# Patient Record
Sex: FEMALE | Race: BLACK OR AFRICAN AMERICAN | NOT HISPANIC OR LATINO | URBAN - METROPOLITAN AREA
[De-identification: names, ages, dates, MRNs, and addresses within clinical notes are randomized per-mention and may not be internally consistent; named-entity substitution may affect disease eponyms.]

---

## 2024-06-12 ENCOUNTER — APPOINTMENT (OUTPATIENT)
Dept: RADIOLOGY | Facility: HOSPITAL | Age: 54
DRG: 919 | End: 2024-06-12
Payer: COMMERCIAL

## 2024-06-12 ENCOUNTER — HOSPITAL ENCOUNTER (INPATIENT)
Facility: HOSPITAL | Age: 54
LOS: 2 days | Discharge: HOME | DRG: 919 | End: 2024-06-14
Attending: STUDENT IN AN ORGANIZED HEALTH CARE EDUCATION/TRAINING PROGRAM | Admitting: OBSTETRICS & GYNECOLOGY
Payer: COMMERCIAL

## 2024-06-12 DIAGNOSIS — N18.6 ESRD (END STAGE RENAL DISEASE) (MULTI): ICD-10-CM

## 2024-06-12 DIAGNOSIS — N85.8 UTERINE MASS: ICD-10-CM

## 2024-06-12 DIAGNOSIS — L89.899 PRESSURE INJURY OF SKIN OF FOOT, UNSPECIFIED INJURY STAGE, UNSPECIFIED LATERALITY: ICD-10-CM

## 2024-06-12 DIAGNOSIS — R10.84 GENERALIZED ABDOMINAL PAIN: ICD-10-CM

## 2024-06-12 DIAGNOSIS — R01.1 MURMUR, CARDIAC: ICD-10-CM

## 2024-06-12 DIAGNOSIS — I10 HYPERTENSION, UNSPECIFIED TYPE: ICD-10-CM

## 2024-06-12 DIAGNOSIS — R10.2 PELVIC PAIN: ICD-10-CM

## 2024-06-12 DIAGNOSIS — N93.9 VAGINAL BLEEDING: Primary | ICD-10-CM

## 2024-06-12 LAB
ABO GROUP (TYPE) IN BLOOD: NORMAL
ALBUMIN SERPL BCP-MCNC: 3.2 G/DL (ref 3.4–5)
ALP SERPL-CCNC: 284 U/L (ref 33–110)
ALT SERPL W P-5'-P-CCNC: 7 U/L (ref 7–45)
ANION GAP SERPL CALC-SCNC: 25 MMOL/L (ref 10–20)
ANTIBODY SCREEN: NORMAL
AST SERPL W P-5'-P-CCNC: 13 U/L (ref 9–39)
BASOPHILS # BLD AUTO: 0.09 X10*3/UL (ref 0–0.1)
BASOPHILS # BLD AUTO: 0.1 X10*3/UL (ref 0–0.1)
BASOPHILS NFR BLD AUTO: 0.6 %
BASOPHILS NFR BLD AUTO: 0.7 %
BILIRUB SERPL-MCNC: 0.5 MG/DL (ref 0–1.2)
BUN SERPL-MCNC: 69 MG/DL (ref 6–23)
CALCIUM SERPL-MCNC: 7.8 MG/DL (ref 8.6–10.6)
CHLORIDE SERPL-SCNC: 96 MMOL/L (ref 98–107)
CO2 SERPL-SCNC: 24 MMOL/L (ref 21–32)
CREAT SERPL-MCNC: 9.69 MG/DL (ref 0.5–1.05)
EGFRCR SERPLBLD CKD-EPI 2021: 4 ML/MIN/1.73M*2
EOSINOPHIL # BLD AUTO: 0.27 X10*3/UL (ref 0–0.7)
EOSINOPHIL # BLD AUTO: 0.29 X10*3/UL (ref 0–0.7)
EOSINOPHIL NFR BLD AUTO: 1.8 %
EOSINOPHIL NFR BLD AUTO: 1.9 %
ERYTHROCYTE [DISTWIDTH] IN BLOOD BY AUTOMATED COUNT: 18.1 % (ref 11.5–14.5)
ERYTHROCYTE [DISTWIDTH] IN BLOOD BY AUTOMATED COUNT: 18.4 % (ref 11.5–14.5)
GLUCOSE BLD MANUAL STRIP-MCNC: 126 MG/DL (ref 74–99)
GLUCOSE BLD MANUAL STRIP-MCNC: 72 MG/DL (ref 74–99)
GLUCOSE BLD MANUAL STRIP-MCNC: 94 MG/DL (ref 74–99)
GLUCOSE SERPL-MCNC: 78 MG/DL (ref 74–99)
HBV SURFACE AB SER-ACNC: 668.5 MIU/ML
HBV SURFACE AG SERPL QL IA: NONREACTIVE
HCT VFR BLD AUTO: 24.4 % (ref 36–46)
HCT VFR BLD AUTO: 26.3 % (ref 36–46)
HGB BLD-MCNC: 7.8 G/DL (ref 12–16)
HGB BLD-MCNC: 8.6 G/DL (ref 12–16)
HOLD SPECIMEN: NORMAL
IMM GRANULOCYTES # BLD AUTO: 0.11 X10*3/UL (ref 0–0.7)
IMM GRANULOCYTES # BLD AUTO: 0.13 X10*3/UL (ref 0–0.7)
IMM GRANULOCYTES NFR BLD AUTO: 0.7 % (ref 0–0.9)
IMM GRANULOCYTES NFR BLD AUTO: 0.9 % (ref 0–0.9)
INR PPP: 1.3 (ref 0.9–1.1)
LYMPHOCYTES # BLD AUTO: 2.14 X10*3/UL (ref 1.2–4.8)
LYMPHOCYTES # BLD AUTO: 2.72 X10*3/UL (ref 1.2–4.8)
LYMPHOCYTES NFR BLD AUTO: 14.3 %
LYMPHOCYTES NFR BLD AUTO: 17.9 %
MCH RBC QN AUTO: 24.5 PG (ref 26–34)
MCH RBC QN AUTO: 24.6 PG (ref 26–34)
MCHC RBC AUTO-ENTMCNC: 32 G/DL (ref 32–36)
MCHC RBC AUTO-ENTMCNC: 32.7 G/DL (ref 32–36)
MCV RBC AUTO: 75 FL (ref 80–100)
MCV RBC AUTO: 77 FL (ref 80–100)
MONOCYTES # BLD AUTO: 1.73 X10*3/UL (ref 0.1–1)
MONOCYTES # BLD AUTO: 1.74 X10*3/UL (ref 0.1–1)
MONOCYTES NFR BLD AUTO: 11.5 %
MONOCYTES NFR BLD AUTO: 11.6 %
NEUTROPHILS # BLD AUTO: 10.23 X10*3/UL (ref 1.2–7.7)
NEUTROPHILS # BLD AUTO: 10.6 X10*3/UL (ref 1.2–7.7)
NEUTROPHILS NFR BLD AUTO: 67.4 %
NEUTROPHILS NFR BLD AUTO: 70.7 %
NRBC BLD-RTO: 0.1 /100 WBCS (ref 0–0)
NRBC BLD-RTO: 0.2 /100 WBCS (ref 0–0)
PLATELET # BLD AUTO: 283 X10*3/UL (ref 150–450)
PLATELET # BLD AUTO: 294 X10*3/UL (ref 150–450)
POTASSIUM SERPL-SCNC: 4.7 MMOL/L (ref 3.5–5.3)
PROT SERPL-MCNC: 7.1 G/DL (ref 6.4–8.2)
PROTHROMBIN TIME: 14.5 SECONDS (ref 9.8–12.8)
RBC # BLD AUTO: 3.18 X10*6/UL (ref 4–5.2)
RBC # BLD AUTO: 3.5 X10*6/UL (ref 4–5.2)
RH FACTOR (ANTIGEN D): NORMAL
SODIUM SERPL-SCNC: 140 MMOL/L (ref 136–145)
WBC # BLD AUTO: 15 X10*3/UL (ref 4.4–11.3)
WBC # BLD AUTO: 15.2 X10*3/UL (ref 4.4–11.3)

## 2024-06-12 PROCEDURE — 86706 HEP B SURFACE ANTIBODY: CPT | Performed by: STUDENT IN AN ORGANIZED HEALTH CARE EDUCATION/TRAINING PROGRAM

## 2024-06-12 PROCEDURE — 2500000004 HC RX 250 GENERAL PHARMACY W/ HCPCS (ALT 636 FOR OP/ED)

## 2024-06-12 PROCEDURE — 76857 US EXAM PELVIC LIMITED: CPT | Performed by: STUDENT IN AN ORGANIZED HEALTH CARE EDUCATION/TRAINING PROGRAM

## 2024-06-12 PROCEDURE — 86923 COMPATIBILITY TEST ELECTRIC: CPT

## 2024-06-12 PROCEDURE — 85610 PROTHROMBIN TIME: CPT | Performed by: NURSE PRACTITIONER

## 2024-06-12 PROCEDURE — 99223 1ST HOSP IP/OBS HIGH 75: CPT

## 2024-06-12 PROCEDURE — 87340 HEPATITIS B SURFACE AG IA: CPT | Performed by: STUDENT IN AN ORGANIZED HEALTH CARE EDUCATION/TRAINING PROGRAM

## 2024-06-12 PROCEDURE — 96375 TX/PRO/DX INJ NEW DRUG ADDON: CPT

## 2024-06-12 PROCEDURE — 74177 CT ABD & PELVIS W/CONTRAST: CPT

## 2024-06-12 PROCEDURE — 2500000002 HC RX 250 W HCPCS SELF ADMINISTERED DRUGS (ALT 637 FOR MEDICARE OP, ALT 636 FOR OP/ED): Performed by: STUDENT IN AN ORGANIZED HEALTH CARE EDUCATION/TRAINING PROGRAM

## 2024-06-12 PROCEDURE — 2550000001 HC RX 255 CONTRASTS: Performed by: OBSTETRICS & GYNECOLOGY

## 2024-06-12 PROCEDURE — 85025 COMPLETE CBC W/AUTO DIFF WBC: CPT | Performed by: NURSE PRACTITIONER

## 2024-06-12 PROCEDURE — 99291 CRITICAL CARE FIRST HOUR: CPT | Performed by: STUDENT IN AN ORGANIZED HEALTH CARE EDUCATION/TRAINING PROGRAM

## 2024-06-12 PROCEDURE — 74177 CT ABD & PELVIS W/CONTRAST: CPT | Performed by: RADIOLOGY

## 2024-06-12 PROCEDURE — 36415 COLL VENOUS BLD VENIPUNCTURE: CPT | Performed by: NURSE PRACTITIONER

## 2024-06-12 PROCEDURE — 82435 ASSAY OF BLOOD CHLORIDE: CPT | Performed by: NURSE PRACTITIONER

## 2024-06-12 PROCEDURE — 2500000001 HC RX 250 WO HCPCS SELF ADMINISTERED DRUGS (ALT 637 FOR MEDICARE OP): Performed by: STUDENT IN AN ORGANIZED HEALTH CARE EDUCATION/TRAINING PROGRAM

## 2024-06-12 PROCEDURE — 1210000001 HC SEMI-PRIVATE ROOM DAILY

## 2024-06-12 PROCEDURE — 96374 THER/PROPH/DIAG INJ IV PUSH: CPT

## 2024-06-12 PROCEDURE — 82947 ASSAY GLUCOSE BLOOD QUANT: CPT

## 2024-06-12 PROCEDURE — 76856 US EXAM PELVIC COMPLETE: CPT

## 2024-06-12 PROCEDURE — 86901 BLOOD TYPING SEROLOGIC RH(D): CPT | Performed by: NURSE PRACTITIONER

## 2024-06-12 PROCEDURE — 2500000004 HC RX 250 GENERAL PHARMACY W/ HCPCS (ALT 636 FOR OP/ED): Performed by: NURSE PRACTITIONER

## 2024-06-12 RX ORDER — METOCLOPRAMIDE HYDROCHLORIDE 5 MG/ML
10 INJECTION INTRAMUSCULAR; INTRAVENOUS EVERY 6 HOURS PRN
Status: DISCONTINUED | OUTPATIENT
Start: 2024-06-12 | End: 2024-06-14 | Stop reason: HOSPADM

## 2024-06-12 RX ORDER — ONDANSETRON HYDROCHLORIDE 2 MG/ML
4 INJECTION, SOLUTION INTRAVENOUS EVERY 6 HOURS PRN
Status: DISCONTINUED | OUTPATIENT
Start: 2024-06-12 | End: 2024-06-14 | Stop reason: HOSPADM

## 2024-06-12 RX ORDER — SEVELAMER CARBONATE 800 MG/1
800 TABLET, FILM COATED ORAL
Status: DISCONTINUED | OUTPATIENT
Start: 2024-06-12 | End: 2024-06-14 | Stop reason: HOSPADM

## 2024-06-12 RX ORDER — NAPROXEN SODIUM 220 MG/1
81 TABLET, FILM COATED ORAL DAILY
Status: DISCONTINUED | OUTPATIENT
Start: 2024-06-12 | End: 2024-06-14 | Stop reason: HOSPADM

## 2024-06-12 RX ORDER — HYDROMORPHONE HYDROCHLORIDE 1 MG/ML
INJECTION, SOLUTION INTRAMUSCULAR; INTRAVENOUS; SUBCUTANEOUS
Status: COMPLETED
Start: 2024-06-12 | End: 2024-06-12

## 2024-06-12 RX ORDER — ONDANSETRON HYDROCHLORIDE 2 MG/ML
4 INJECTION, SOLUTION INTRAVENOUS ONCE
Status: COMPLETED | OUTPATIENT
Start: 2024-06-12 | End: 2024-06-12

## 2024-06-12 RX ORDER — ONDANSETRON 4 MG/1
4 TABLET, FILM COATED ORAL EVERY 6 HOURS PRN
Status: DISCONTINUED | OUTPATIENT
Start: 2024-06-12 | End: 2024-06-14 | Stop reason: HOSPADM

## 2024-06-12 RX ORDER — DEXTROSE 50 % IN WATER (D50W) INTRAVENOUS SYRINGE
25
Status: DISCONTINUED | OUTPATIENT
Start: 2024-06-12 | End: 2024-06-14 | Stop reason: HOSPADM

## 2024-06-12 RX ORDER — DEXTROSE 50 % IN WATER (D50W) INTRAVENOUS SYRINGE
12.5
Status: DISCONTINUED | OUTPATIENT
Start: 2024-06-12 | End: 2024-06-14 | Stop reason: HOSPADM

## 2024-06-12 RX ORDER — INSULIN LISPRO 100 [IU]/ML
0-5 INJECTION, SOLUTION INTRAVENOUS; SUBCUTANEOUS
Status: DISCONTINUED | OUTPATIENT
Start: 2024-06-12 | End: 2024-06-12

## 2024-06-12 RX ORDER — AMLODIPINE BESYLATE 10 MG/1
10 TABLET ORAL DAILY
Status: DISCONTINUED | OUTPATIENT
Start: 2024-06-12 | End: 2024-06-14 | Stop reason: HOSPADM

## 2024-06-12 RX ORDER — CARVEDILOL 25 MG/1
25 TABLET ORAL 2 TIMES DAILY
Status: DISCONTINUED | OUTPATIENT
Start: 2024-06-12 | End: 2024-06-14 | Stop reason: HOSPADM

## 2024-06-12 RX ORDER — ONDANSETRON HYDROCHLORIDE 2 MG/ML
INJECTION, SOLUTION INTRAVENOUS
Status: COMPLETED
Start: 2024-06-12 | End: 2024-06-12

## 2024-06-12 RX ORDER — MORPHINE SULFATE 4 MG/ML
4 INJECTION INTRAVENOUS ONCE
Status: COMPLETED | OUTPATIENT
Start: 2024-06-12 | End: 2024-06-12

## 2024-06-12 RX ORDER — OXYCODONE HYDROCHLORIDE 5 MG/1
5 TABLET ORAL EVERY 6 HOURS PRN
Status: DISCONTINUED | OUTPATIENT
Start: 2024-06-12 | End: 2024-06-14 | Stop reason: HOSPADM

## 2024-06-12 RX ORDER — MEDROXYPROGESTERONE ACETATE 10 MG/1
10 TABLET ORAL EVERY 8 HOURS
Status: DISCONTINUED | OUTPATIENT
Start: 2024-06-12 | End: 2024-06-12

## 2024-06-12 RX ORDER — METOCLOPRAMIDE 10 MG/1
10 TABLET ORAL EVERY 6 HOURS PRN
Status: DISCONTINUED | OUTPATIENT
Start: 2024-06-12 | End: 2024-06-14 | Stop reason: HOSPADM

## 2024-06-12 RX ORDER — ACETAMINOPHEN 325 MG/1
650 TABLET ORAL EVERY 6 HOURS PRN
Status: DISCONTINUED | OUTPATIENT
Start: 2024-06-12 | End: 2024-06-14 | Stop reason: HOSPADM

## 2024-06-12 RX ORDER — HYDROMORPHONE HYDROCHLORIDE 1 MG/ML
0.5 INJECTION, SOLUTION INTRAMUSCULAR; INTRAVENOUS; SUBCUTANEOUS ONCE
Status: COMPLETED | OUTPATIENT
Start: 2024-06-12 | End: 2024-06-12

## 2024-06-12 RX ORDER — ATORVASTATIN CALCIUM 80 MG/1
80 TABLET, FILM COATED ORAL NIGHTLY
Status: DISCONTINUED | OUTPATIENT
Start: 2024-06-12 | End: 2024-06-14 | Stop reason: HOSPADM

## 2024-06-12 RX ORDER — MEDROXYPROGESTERONE ACETATE 10 MG/1
20 TABLET ORAL 3 TIMES DAILY
Status: DISCONTINUED | OUTPATIENT
Start: 2024-06-12 | End: 2024-06-13

## 2024-06-12 SDOH — SOCIAL STABILITY: SOCIAL INSECURITY: DO YOU FEEL UNSAFE GOING BACK TO THE PLACE WHERE YOU ARE LIVING?: YES

## 2024-06-12 SDOH — SOCIAL STABILITY: SOCIAL INSECURITY: ARE YOU OR HAVE YOU BEEN THREATENED OR ABUSED PHYSICALLY, EMOTIONALLY, OR SEXUALLY BY ANYONE?: NO

## 2024-06-12 SDOH — SOCIAL STABILITY: SOCIAL INSECURITY: DO YOU FEEL ANYONE HAS EXPLOITED OR TAKEN ADVANTAGE OF YOU FINANCIALLY OR OF YOUR PERSONAL PROPERTY?: NO

## 2024-06-12 SDOH — SOCIAL STABILITY: SOCIAL INSECURITY: HAVE YOU HAD THOUGHTS OF HARMING ANYONE ELSE?: NO

## 2024-06-12 SDOH — SOCIAL STABILITY: SOCIAL INSECURITY: ARE THERE ANY APPARENT SIGNS OF INJURIES/BEHAVIORS THAT COULD BE RELATED TO ABUSE/NEGLECT?: NO

## 2024-06-12 SDOH — SOCIAL STABILITY: SOCIAL INSECURITY: ABUSE: ADULT

## 2024-06-12 SDOH — SOCIAL STABILITY: SOCIAL INSECURITY: WERE YOU ABLE TO COMPLETE ALL THE BEHAVIORAL HEALTH SCREENINGS?: YES

## 2024-06-12 SDOH — SOCIAL STABILITY: SOCIAL INSECURITY: HAS ANYONE EVER THREATENED TO HURT YOUR FAMILY OR YOUR PETS?: NO

## 2024-06-12 SDOH — SOCIAL STABILITY: SOCIAL INSECURITY: DOES ANYONE TRY TO KEEP YOU FROM HAVING/CONTACTING OTHER FRIENDS OR DOING THINGS OUTSIDE YOUR HOME?: NO

## 2024-06-12 SDOH — SOCIAL STABILITY: SOCIAL INSECURITY: HAVE YOU HAD ANY THOUGHTS OF HARMING ANYONE ELSE?: NO

## 2024-06-12 ASSESSMENT — PAIN DESCRIPTION - LOCATION: LOCATION: ABDOMEN

## 2024-06-12 ASSESSMENT — COLUMBIA-SUICIDE SEVERITY RATING SCALE - C-SSRS
6. HAVE YOU EVER DONE ANYTHING, STARTED TO DO ANYTHING, OR PREPARED TO DO ANYTHING TO END YOUR LIFE?: NO
2. HAVE YOU ACTUALLY HAD ANY THOUGHTS OF KILLING YOURSELF?: NO
6. HAVE YOU EVER DONE ANYTHING, STARTED TO DO ANYTHING, OR PREPARED TO DO ANYTHING TO END YOUR LIFE?: NO
1. IN THE PAST MONTH, HAVE YOU WISHED YOU WERE DEAD OR WISHED YOU COULD GO TO SLEEP AND NOT WAKE UP?: NO
2. HAVE YOU ACTUALLY HAD ANY THOUGHTS OF KILLING YOURSELF?: NO
1. IN THE PAST MONTH, HAVE YOU WISHED YOU WERE DEAD OR WISHED YOU COULD GO TO SLEEP AND NOT WAKE UP?: NO

## 2024-06-12 ASSESSMENT — PAIN DESCRIPTION - DESCRIPTORS
DESCRIPTORS: CRAMPING
DESCRIPTORS: CRAMPING

## 2024-06-12 ASSESSMENT — PAIN SCALES - GENERAL
PAINLEVEL_OUTOF10: 10 - WORST POSSIBLE PAIN
PAINLEVEL_OUTOF10: 8
PAINLEVEL_OUTOF10: 5 - MODERATE PAIN
PAINLEVEL_OUTOF10: 10 - WORST POSSIBLE PAIN
PAINLEVEL_OUTOF10: 8
PAINLEVEL_OUTOF10: 10 - WORST POSSIBLE PAIN

## 2024-06-12 ASSESSMENT — PAIN - FUNCTIONAL ASSESSMENT
PAIN_FUNCTIONAL_ASSESSMENT: 0-10

## 2024-06-12 ASSESSMENT — LIFESTYLE VARIABLES
EVER HAD A DRINK FIRST THING IN THE MORNING TO STEADY YOUR NERVES TO GET RID OF A HANGOVER: NO
SKIP TO QUESTIONS 9-10: 1
SUBSTANCE_ABUSE_PAST_12_MONTHS: NO
AUDIT-C TOTAL SCORE: 0
EVER FELT BAD OR GUILTY ABOUT YOUR DRINKING: NO
HOW OFTEN DO YOU HAVE A DRINK CONTAINING ALCOHOL: NEVER
HAVE PEOPLE ANNOYED YOU BY CRITICIZING YOUR DRINKING: NO
TOTAL SCORE: 0
AUDIT-C TOTAL SCORE: 0
PRESCIPTION_ABUSE_PAST_12_MONTHS: NO
HOW OFTEN DO YOU HAVE 6 OR MORE DRINKS ON ONE OCCASION: NEVER
HOW MANY STANDARD DRINKS CONTAINING ALCOHOL DO YOU HAVE ON A TYPICAL DAY: PATIENT DOES NOT DRINK
HAVE YOU EVER FELT YOU SHOULD CUT DOWN ON YOUR DRINKING: NO

## 2024-06-12 ASSESSMENT — PATIENT HEALTH QUESTIONNAIRE - PHQ9
1. LITTLE INTEREST OR PLEASURE IN DOING THINGS: NOT AT ALL
SUM OF ALL RESPONSES TO PHQ9 QUESTIONS 1 & 2: 0
2. FEELING DOWN, DEPRESSED OR HOPELESS: NOT AT ALL

## 2024-06-12 ASSESSMENT — PAIN DESCRIPTION - PAIN TYPE: TYPE: ACUTE PAIN

## 2024-06-12 NOTE — ED TRIAGE NOTES
States she has been intermittently bleeding for a month but began having pain and passing clots last night. H/O dialysis, last treatment yesterday.

## 2024-06-12 NOTE — PROGRESS NOTES
Patient has been identified as having an emergent need for administration of iodinated contrast for CT scan prior to result of laboratory studies OR despite known elevated GFR due to possibility of life and/or limb threatening pathology.     I acknowledge the risks and benefits of emergently proceeding with contrast administration including that, at present, it is the position of the [employer] College [employer] that contrast induced nephropathy (TE) is a rare but possible consequence. At this time the benefits of proceeding with contrast administration outweigh the risks.     Attempts will be made to mitigate possible TE risk with IV fluid hydration if able.

## 2024-06-12 NOTE — PROGRESS NOTES
"Dodie Contreras is a 54 y.o. female on day 0 of admission presenting with Vaginal bleeding.    Subjective   53 yo l   post-menopausal with PMH ESRD (on dialysis), T2DM, HTN, stroke, seizures and anemia 2/2 CKD who presents for vaginal bleeding that began last night. Patient presents with  who provides portions of HPI. Patient reports h/o vaginal spotting for the past 4 weeks. She was seen by OBGYN in [place] on 24 for annul exam and had cervical biopsy, EMB and ECC performed due to abnormal  appearing cervix per chart review with benign results. Patient reports continous vaginal spotting since biopsy until last night when she had sudden onset of heavy vaginal bleeding, soaking through three Depends pads. Prior to ED arrival, patient  reports she had heavier bleeding, get blood on her shoes and \"collapsed\" on to him and he had to shake her to wake up, denies head trauma.  Pt also reports history of b/l lower  abdominal pain for the past few months that worsened ,yesterday, with associated nausea without emesis. Patient has been tolerating PO intake. Denies change in bowel habits, unintentional weight loss/ weight gain. Denies h/o blood transfusion.      In ED, hgb 8.6, Plt 283, Cr 9.69, Abdominal US notable for thickened endometrium strip (1.5cm), diffuse calcifications within myometrium, pelvic examination limited in s/o vaginal bleeding     OBHx: P6, x67    GynHx: Menopausal ( age 50) denies h/o abnormal pap, STD hx  PMH: ESRD (on chronic dialysis), T2DM, HTN, stroke, seizures, anemia 2/2 CKD   PSH: eye surgeries  All: NKDA  Meds: ###  Sochx: denies tobacco, alcohol and illicit drug use     Objective     Physical Exam  Constitutional:       Appearance: Normal appearance.   HENT:      Head: Normocephalic and atraumatic.   Cardiovascular:      Rate and Rhythm: Normal rate.   Pulmonary:      Effort: Pulmonary effort is normal.      Breath sounds: Normal breath sounds.   Abdominal:      Comments: " "Distended, b/l lower abdomen tenderness to deep palpation, with LLQ guarding    Genitourinary:     Comments:  tennis sized blood clot protruding through speculum, with persistent trickling of dark red blood once clot was evacuated, cervix visualization obstructed due to vaginal bleeding , patient endorsed discomfort during exam   Skin:     General: Skin is warm and dry.   Neurological:      Mental Status: She is alert. Mental status is at baseline.       Last Recorded Vitals  Blood pressure 148/89, pulse 88, temperature 36.5 °C (97.7 °F), resp. rate 18, height 1.651 m (5' 5\"), weight 80.7 kg (178 lb), SpO2 99%.  Intake/Output last 3 Shifts:  No intake/output data recorded.    Relevant Results  .  Results for orders placed or performed during the hospital encounter of 06/12/24 (from the past 24 hour(s))   CBC and Auto Differential   Result Value Ref Range    WBC 15.0 (H) 4.4 - 11.3 x10*3/uL    nRBC 0.2 (H) 0.0 - 0.0 /100 WBCs    RBC 3.50 (L) 4.00 - 5.20 x10*6/uL    Hemoglobin 8.6 (L) 12.0 - 16.0 g/dL    Hematocrit 26.3 (L) 36.0 - 46.0 %    MCV 75 (L) 80 - 100 fL    MCH 24.6 (L) 26.0 - 34.0 pg    MCHC 32.7 32.0 - 36.0 g/dL    RDW 18.1 (H) 11.5 - 14.5 %    Platelets 283 150 - 450 x10*3/uL    Neutrophils % 70.7 40.0 - 80.0 %    Immature Granulocytes %, Automated 0.9 0.0 - 0.9 %    Lymphocytes % 14.3 13.0 - 44.0 %    Monocytes % 11.6 2.0 - 10.0 %    Eosinophils % 1.8 0.0 - 6.0 %    Basophils % 0.7 0.0 - 2.0 %    Neutrophils Absolute 10.60 (H) 1.20 - 7.70 x10*3/uL    Immature Granulocytes Absolute, Automated 0.13 0.00 - 0.70 x10*3/uL    Lymphocytes Absolute 2.14 1.20 - 4.80 x10*3/uL    Monocytes Absolute 1.73 (H) 0.10 - 1.00 x10*3/uL    Eosinophils Absolute 0.27 0.00 - 0.70 x10*3/uL    Basophils Absolute 0.10 0.00 - 0.10 x10*3/uL   Comprehensive metabolic panel   Result Value Ref Range    Glucose 78 74 - 99 mg/dL    Sodium 140 136 - 145 mmol/L    Potassium 4.7 3.5 - 5.3 mmol/L    Chloride 96 (L) 98 - 107 mmol/L    " Bicarbonate 24 21 - 32 mmol/L    Anion Gap 25 (H) 10 - 20 mmol/L    Urea Nitrogen 69 (H) 6 - 23 mg/dL    Creatinine 9.69 (H) 0.50 - 1.05 mg/dL    eGFR 4 (L) >60 mL/min/1.73m*2    Calcium 7.8 (L) 8.6 - 10.6 mg/dL    Albumin 3.2 (L) 3.4 - 5.0 g/dL    Alkaline Phosphatase 284 (H) 33 - 110 U/L    Total Protein 7.1 6.4 - 8.2 g/dL    AST 13 9 - 39 U/L    Bilirubin, Total 0.5 0.0 - 1.2 mg/dL    ALT 7 7 - 45 U/L   Protime-INR   Result Value Ref Range    Protime 14.5 (H) 9.8 - 12.8 seconds    INR 1.3 (H) 0.9 - 1.1   SST TOP   Result Value Ref Range    Extra Tube Hold for add-ons.    Type And Screen   Result Value Ref Range    ABO TYPE B     Rh TYPE NEG     ANTIBODY SCREEN NEG     nt      Assessment/Plan   Principal Problem:    Vaginal bleeding  55 yo l   post-menopausal with PMH ESRD (on dialysis), T2DM, HTN, stroke, seizures and anemia 2/2 CKD who presents for acute vaginal bleeding    Neuro:   -continue home oxycodone and tylenol for pain control  -h/o stroke, continue aspirin 81 mg daily     CV/Heme:  - Hgb 8.6 on admission -> 1600 labs and AM labs pending  - vitals wnl, asx; continue to monitor  - HTN, continue home Amlodipine and Coreg  - HLD, continue home Lipitor      FEN/GI:   -Regular diet, NPO at midnight   - replete electrolytes PRN    :   - vaginal bleeding x 4 weeks with acute worsening vaginal bleeding with thickened endometrium, s/p cervical biopsy.  Due to limited bedside pelvic exam and patient discomfort, vaginal bleeding source of bleeding is unknown, possibly cervical vs uterine in which AUB-L vs AUB-hyperplasia are differentials, however AUB-M can't be fully ruled out, although recent EMB was benign.   -Patient counseled on recommendation for repeat pelvic exam once bleeding has stable, in which possible exam under anesthesia vs D&C hysteroscopy may be indicated if unable to better assess. Plan to be NPO at midnight for possible EUA tomorrow   - Abdominal US notable for thicken endometrium and  diffuse myometrium calcifications and pelvic ascites. Patient declined TVUS at this time   - CT A/P pending   - Will start Provera 10mg TID given VB  - x1 dose of Desmopressin to be given in ED for VB in s/o uremia   - ESRD (on chronic dialysis Tues, Thurs, Sat) Cr 9.69, hold NSAIDs, baseline 10.17, continue home Renvela. Nephrology cs to arrange possible inpatient dialysis while admitted     Endo:   - T2DM, pt reports taking Lantus 10u qHS prn. Will monitor BG and will defer giving nighttime  insulin as she will be NPO at midnight   - while eating: fasting, pre-prandials, pre-prandial ISS   - while NPO, q4 hr POCT BG checks     DVT Prophylaxis:  - SCDs in place, encourage early ambulation  - heparin ppx held in s/o vaginal bleeding     Dispo: admit to gynecology for vaginal bleeding management     Seen and d/w Dr. Reyez,        Angela Fam MD, PGY2

## 2024-06-12 NOTE — ED PROVIDER NOTES
"Emergency Department Encounter  [facility] EMERGENCY MEDICINE    Patient: Dodie Contreras  MRN: [id number]  : 1970  Date of Evaluation: 2024  ED Provider: ROHAN Brady    ED care was supervised by Dr. Nunez who independently examined and evaluated the patient. Please see their attestation note for further details.    Limitations to history: none  Independent Historian: self  Records reviewed: Care everywhere, paper chart, Inpatient and outpatient notes    Chief Complaint       Chief Complaint   Patient presents with    Vaginal Bleeding    Abdominal Pain     Agdaagux    (Location/Symptom, Timing/Onset, Context/Setting, Quality, Duration, Modifying Factors, Severity) Note limiting factors.   Dodie Contreras is a 54 y.o. female with PmHx of HTN, T2DM, seizure, and T//Sat dialysis presenting with c/o vaginal bleeding x2 month that heavily increased into \"gushing blood\" starting last night. Pt states she has since soaked 3 depends today. Was seen by OBGYN due to bleeding and had biopsy obtained May 23rd (At [facility]) with scheduled f/o for . Pt further endorses lower abd pain 10/10 with intermittent nausea however is eating and drinking okay. Denies vomiting, fever, chills, CP, SOB, dizziness, not on anticoagulation, not sexually active. Denies urinary symptoms as she no longer produces urine. Does state she feels constipated as she usually has difficulty with constipation.       ROS:     Review of Systems  14 systems reviewed and otherwise acutely negative except as in the Agdaagux.          Past History   No past medical history on file.  No past surgical history on file.  Social History     Socioeconomic History    Marital status:      Spouse name: Not on file    Number of children: Not on file    Years of education: Not on file    Highest education level: Not on file   Occupational History    Not on file   Tobacco Use    Smoking status: Not on file    Smokeless tobacco: " Not on file   Substance and Sexual Activity    Alcohol use: Not on file    Drug use: Not on file    Sexual activity: Not on file   Other Topics Concern    Not on file   Social History Narrative    Not on file     Social Determinants of Health     Financial Resource Strain: Medium Risk (12/6/2021)    Received from [facility]    Overall Financial Resource Strain (CARDIA)     Difficulty of Paying Living Expenses: Somewhat hard   Food Insecurity: No Food Insecurity (5/6/2024)    Received from [employer]    Hunger Vital Sign     Worried About Running Out of Food in the Last Year: Never true     Ran Out of Food in the Last Year: Never true   Transportation Needs: No Transportation Needs (5/6/2024)    Received from [employer]    PRAPARE - Transportation     Lack of Transportation (Medical): No     Lack of Transportation (Non-Medical): No   Physical Activity: Not on file   Stress: Not on file   Social Connections: Not on file   Intimate Partner Violence: Not At Risk (5/6/2024)    Received from [facility]    Humiliation, Afraid, Rape, and Kick questionnaire     Fear of Current or Ex-Partner: No     Emotionally Abused: No     Physically Abused: No     Sexually Abused: No   Housing Stability: Low Risk  (5/6/2024)    Received from [employer]    Housing Stability Vital Sign     Unable to Pay for Housing in the Last Year: No     Number of Places Lived in the Last Year: 1     Unstable Housing in the Last Year: No       Medications/Allergies     Previous Medications    No medications on file     No Known Allergies     Physical Exam       ED Triage Vitals [06/12/24 1202]   Temperature Heart Rate Respirations BP   36.5 °C (97.7 °F) 88 18 156/75      Pulse Ox Temp src Heart Rate Source Patient Position   99 % -- -- --      BP Location FiO2 (%)     -- --         Physical Exam    GENERAL:  The patient appears nourished and normally developed. Vital signs as documented.     HEENT:  Head normocephalic,  atraumatic, EOMs intact, PERRLA, Mucous membranes moist. Nares patent without copious rhinorrhea.  No lymphadenopathy.    PULMONARY:  Lungs are clear to auscultation, without any respiratory distress. Able to speak full sentences, no accessory muscle use    CARDIAC:   Normal rate. No murmurs, rubs or gallops    ABDOMEN:  Soft, non-distended, diffuse tenderness, BS positive x 4 quadrants, No rebound or guarding, no peritoneal signs, no CVA tenderness, no masses or organomegaly    : External exam unremarkable, speculum exam clot in vaginal canal, after this was evacuated there was immediate pooling of dark blood in vaginal canal, unable to visualize the cervix, was not bright red blood    MUSCULOSKELETAL:   Able to ambulate, Non edematous, with no obvious deformities. Pulses intact distal    SKIN:   Good color, with no significant rashes.  No pallor.    NEURO:  No obvious neurological deficits, normal sensation and strength bilaterally.  Able to follow commands, NIH 0, CN 2-12 intact.        Diagnostics   Labs:  Results for orders placed or performed during the hospital encounter of 06/12/24   CBC and Auto Differential   Result Value Ref Range    WBC 15.0 (H) 4.4 - 11.3 x10*3/uL    nRBC 0.2 (H) 0.0 - 0.0 /100 WBCs    RBC 3.50 (L) 4.00 - 5.20 x10*6/uL    Hemoglobin 8.6 (L) 12.0 - 16.0 g/dL    Hematocrit 26.3 (L) 36.0 - 46.0 %    MCV 75 (L) 80 - 100 fL    MCH 24.6 (L) 26.0 - 34.0 pg    MCHC 32.7 32.0 - 36.0 g/dL    RDW 18.1 (H) 11.5 - 14.5 %    Platelets 283 150 - 450 x10*3/uL    Neutrophils % 70.7 40.0 - 80.0 %    Immature Granulocytes %, Automated 0.9 0.0 - 0.9 %    Lymphocytes % 14.3 13.0 - 44.0 %    Monocytes % 11.6 2.0 - 10.0 %    Eosinophils % 1.8 0.0 - 6.0 %    Basophils % 0.7 0.0 - 2.0 %    Neutrophils Absolute 10.60 (H) 1.20 - 7.70 x10*3/uL    Immature Granulocytes Absolute, Automated 0.13 0.00 - 0.70 x10*3/uL    Lymphocytes Absolute 2.14 1.20 - 4.80 x10*3/uL    Monocytes Absolute 1.73 (H) 0.10 - 1.00  x10*3/uL    Eosinophils Absolute 0.27 0.00 - 0.70 x10*3/uL    Basophils Absolute 0.10 0.00 - 0.10 x10*3/uL   Comprehensive metabolic panel   Result Value Ref Range    Glucose 78 74 - 99 mg/dL    Sodium 140 136 - 145 mmol/L    Potassium 4.7 3.5 - 5.3 mmol/L    Chloride 96 (L) 98 - 107 mmol/L    Bicarbonate 24 21 - 32 mmol/L    Anion Gap 25 (H) 10 - 20 mmol/L    Urea Nitrogen 69 (H) 6 - 23 mg/dL    Creatinine 9.69 (H) 0.50 - 1.05 mg/dL    eGFR 4 (L) >60 mL/min/1.73m*2    Calcium 7.8 (L) 8.6 - 10.6 mg/dL    Albumin 3.2 (L) 3.4 - 5.0 g/dL    Alkaline Phosphatase 284 (H) 33 - 110 U/L    Total Protein 7.1 6.4 - 8.2 g/dL    AST 13 9 - 39 U/L    Bilirubin, Total 0.5 0.0 - 1.2 mg/dL    ALT 7 7 - 45 U/L   Protime-INR   Result Value Ref Range    Protime 14.5 (H) 9.8 - 12.8 seconds    INR 1.3 (H) 0.9 - 1.1   SST TOP   Result Value Ref Range    Extra Tube Hold for add-ons.      Radiographs:  US pelvis   Final Result   1. Abnormally thickened endometrium measuring up to 1.5 cm. Recommend   correlation with endometrial biopsy results if available or repeat   biopsy for further evaluation.   2. Heterogeneous myometrium with diffuse calcifications, hindering   assessment.   3. Small-to-moderate pelvic ascites.   4. Nonvisualization of the ovaries.        I personally reviewed the images/study and I agree with Eula Abbott DO's (radiology resident) findings as stated. This study   was interpreted at [facility],   [place], [place].        MACRO:   None        Signed by: Alma Iraheta 6/12/2024 3:12 PM   Dictation workstation:   [id number]      CT abdomen pelvis w IV contrast    (Results Pending)         Assessment   In brief, Dodie Contreras is a 54 y.o. female who presented to the emergency department for vaginal bleeding, abdominal pain, 3 weeks status post cervical biopsy    Plan   IV, lab work, pelvic exam, imaging, OB/GYN consultation    Differentials   Malignancy  Biopsy site  "hemorrhage  Dysfunctional uterine bleeding    ED Course     Diagnoses as of 06/12/24 1617   Vaginal bleeding   Generalized abdominal pain       Visit Vitals  /89   Pulse 88   Temp 36.5 °C (97.7 °F)   Resp 18   Ht 1.651 m (5' 5\")   Wt 80.7 kg (178 lb)   SpO2 99%   BMI 29.62 kg/m²   BSA 1.92 m²       Medications   desmopressin (DDAVP) 24.4 mcg in sodium chloride 0.9% 50 mL IV (has no administration in time range)   morphine injection 4 mg (4 mg intravenous Given 6/12/24 1258)   ondansetron (Zofran) injection  - Omnicell Override Pull (4 mg  Given 6/12/24 1259)   HYDROmorphone (Dilaudid) injection 0.5 mg (0.5 mg intravenous Given 6/12/24 1510)   ondansetron (Zofran) injection 4 mg (4 mg intravenous Given 6/12/24 1509)       Plan of care discussed, patient's vital signs were stable, is not hypotensive or tachycardic, is afebrile.  Unable to pull up patient's previous records in the EMR on initial evaluation.  Lab work obtained and significant for leukocytosis with a white count of 15,000, hemoglobin 8.6, do not know if this is her baseline.  Given Zofran, morphine, Dilaudid for her pain, nausea, has a large amount of vaginal bleeding on physical exam, OB/GYN was called for consultation, patient was sent for ultrasound.  Results to be followed up by GYN, will sign out to oncoming shift for review of additional imaging and final disposition.      Final Impression      1. Vaginal bleeding    2. Generalized abdominal pain          DISPOSITION  Disposition:  Signout to Dr. Germain  Patient condition is stable    Comment: Please note this report has been produced using speech recognition software and may contain errors related to that system including errors in grammar, punctuation, and spelling, as well as words and phrases that may be inappropriate.  If there are any questions or concerns please feel free to contact the dictating provider for clarification.    Hiram Cardenas, " APRN-CNP      ----------------------------------------------------    Shared HEMANT Attestation:     This patient was seen by the advanced practice provider.  I personally saw the patient and made/approved the management plan and take responsibility for the patient management.     History: Patient is a 54-year-old woman with history of being postmenopausal as a few years ago and diabetes, hypertension and end-stage renal disease on dialysis who presents with vaginal bleeding.  Patient has had vaginal bleeding for the last month and then went to an OB/GYN and had a biopsy done yesterday.  She reports increased bleeding since then.  She does report chronic abdominal pain that is unchanged the last few weeks and reports chronic constipation.  She denies any dysuria increased urinary frequency hematuria black or bloody stools new back pain or numbness tingling or weakness.  She denies any missed dialysis sessions and is not on any blood thinners     Exam: Mild lower abdominal tenderness.  Well-appearing.  No acute distress     MDM: Patient presents with vaginal bleeding in the setting of recent OB/GYN procedure.  Will do a pelvic exam to visualize the cervix to see if there is an actively bleeding lesion as well as get a ultrasound and blood work.  I suspect the bleeding is likely related to her recent OB/GYN procedure or could be persistent bleeding that has progressed over the last month which was the underlying reason for her initial OB/GYN evaluation.  Coagulopathy seems less likely based on history but we will get blood work to check for this as well.    Blood work shows a drop in hemoglobin of 2 g in the last week and there is a large amount of blood in the vaginal vault.  She was uremic likely from renal failure so we did start her on DDAVP and discussed case with OB/GYN.  Patient is hemodynamically stable so we will hold off on blood transfusion for now.       I have seen and examined the patient, agree with the  workup, evaluation, medical decision making, management and diagnosis.  The care plan has been discussed.     Troy Nunez MD    ----------------------------------------------------     JOHNSON Brady-JAREN  06/12/24 1616

## 2024-06-12 NOTE — PROGRESS NOTES
54-year-old female with ESRD on hemodialysis here with abdominal pain and vaginal bleeding.  Previous provider evaluated the patient, deemed her necessary for ultrasound and CT scans which were pending at time of signout.  Ultrasound shows endometrial thickening and ascites, but patient did have a negative biopsy result at outside hospital recently.  Her hemoglobin was 8.6 which appears around her baseline, she did have large clot expulsion in the emergency department but remained hemodynamically stable.  Was given DDAVP given concern for uremic bleeding, consulted gynecology who evaluated the patient, recommended admission to their service.
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
## 2024-06-12 NOTE — ED PROCEDURE NOTE
Procedure  Critical Care    Performed by: Troy Nunez MD  Authorized by: Troy Nunez MD    Critical care provider statement:     Critical care time (minutes):  32  Comments:      Critical Care Time  Authorized and Performed by: Troy Nunez MD  Total critical care time: [32] minutes  Due to a high probability of clinically significant, life threatening deterioration, the patient required my highest level of preparedness to intervene emergently and I personally spent this critical care time directly and personally managing the patient. This critical care time included obtaining a history; examining the patient; pulse oximetry; ordering and review of studies; arranging urgent treatment with development of a management plan; evaluation of patient's response to treatment; frequent reassessment; and, discussions with other providers and patient/family.  This critical care time was performed to assess and manage the high probability of imminent, life-threatening deterioration that could result in multi-organ failure. It was exclusive of separately billable procedures and treating other patients and teaching time.  Please see MDM section and the rest of the note for further information on patient assessment and treatment.             Troy Nunez MD  06/12/24 [id number]

## 2024-06-13 ENCOUNTER — APPOINTMENT (OUTPATIENT)
Dept: RADIOLOGY | Facility: HOSPITAL | Age: 54
DRG: 919 | End: 2024-06-13
Payer: COMMERCIAL

## 2024-06-13 ENCOUNTER — APPOINTMENT (OUTPATIENT)
Dept: DIALYSIS | Facility: HOSPITAL | Age: 54
End: 2024-06-13
Payer: COMMERCIAL

## 2024-06-13 LAB
ABO GROUP (TYPE) IN BLOOD: NORMAL
ALBUMIN FLD-MCNC: 1.8 G/DL
ALBUMIN SERPL BCP-MCNC: 2.7 G/DL (ref 3.4–5)
AMYLASE FLD-CCNC: <10 U/L
ANION GAP SERPL CALC-SCNC: 24 MMOL/L (ref 10–20)
APTT PPP: 31 SECONDS (ref 27–38)
BASOPHILS NFR FLD MANUAL: 0 %
BLASTS NFR FLD MANUAL: 0 %
BLOOD EXPIRATION DATE: NORMAL
BUN SERPL-MCNC: 75 MG/DL (ref 6–23)
CALCIUM SERPL-MCNC: 7.2 MG/DL (ref 8.6–10.6)
CHLORIDE SERPL-SCNC: 96 MMOL/L (ref 98–107)
CLARITY FLD: CLEAR
CO2 SERPL-SCNC: 25 MMOL/L (ref 21–32)
COLOR FLD: YELLOW
CREAT SERPL-MCNC: 10.54 MG/DL (ref 0.5–1.05)
DISPENSE STATUS: NORMAL
EGFRCR SERPLBLD CKD-EPI 2021: 4 ML/MIN/1.73M*2
EOSINOPHIL NFR FLD MANUAL: 0 %
ERYTHROCYTE [DISTWIDTH] IN BLOOD BY AUTOMATED COUNT: 18.6 % (ref 11.5–14.5)
ERYTHROCYTE [DISTWIDTH] IN BLOOD BY AUTOMATED COUNT: 18.6 % (ref 11.5–14.5)
ERYTHROCYTE [DISTWIDTH] IN BLOOD BY AUTOMATED COUNT: 19.2 % (ref 11.5–14.5)
FIBRINOGEN PPP-MCNC: 487 MG/DL (ref 200–400)
GLUCOSE BLD MANUAL STRIP-MCNC: 124 MG/DL (ref 74–99)
GLUCOSE BLD MANUAL STRIP-MCNC: 89 MG/DL (ref 74–99)
GLUCOSE BLD MANUAL STRIP-MCNC: 97 MG/DL (ref 74–99)
GLUCOSE SERPL-MCNC: 91 MG/DL (ref 74–99)
HCT VFR BLD AUTO: 23.4 % (ref 36–46)
HCT VFR BLD AUTO: 26 % (ref 36–46)
HCT VFR BLD AUTO: 26.2 % (ref 36–46)
HGB BLD-MCNC: 7.1 G/DL (ref 12–16)
HGB BLD-MCNC: 7.7 G/DL (ref 12–16)
HGB BLD-MCNC: 7.7 G/DL (ref 12–16)
IMMATURE GRANULOCYTES IN FLUID: 0 %
INR PPP: 1.3 (ref 0.9–1.1)
LYMPHOCYTES NFR FLD MANUAL: 73 %
MCH RBC QN AUTO: 23.9 PG (ref 26–34)
MCH RBC QN AUTO: 24.1 PG (ref 26–34)
MCH RBC QN AUTO: 24.2 PG (ref 26–34)
MCHC RBC AUTO-ENTMCNC: 29.4 G/DL (ref 32–36)
MCHC RBC AUTO-ENTMCNC: 29.6 G/DL (ref 32–36)
MCHC RBC AUTO-ENTMCNC: 30.3 G/DL (ref 32–36)
MCV RBC AUTO: 80 FL (ref 80–100)
MCV RBC AUTO: 81 FL (ref 80–100)
MCV RBC AUTO: 81 FL (ref 80–100)
MONOS+MACROS NFR FLD MANUAL: 20 %
NEUTROPHILS NFR FLD MANUAL: 7 %
NRBC BLD-RTO: 0.2 /100 WBCS (ref 0–0)
OTHER CELLS NFR FLD MANUAL: 0 %
PHOSPHATE SERPL-MCNC: 10.5 MG/DL (ref 2.5–4.9)
PLASMA CELLS NFR FLD MANUAL: 0 %
PLATELET # BLD AUTO: 258 X10*3/UL (ref 150–450)
PLATELET # BLD AUTO: 271 X10*3/UL (ref 150–450)
PLATELET # BLD AUTO: 276 X10*3/UL (ref 150–450)
POTASSIUM FLD-SCNC: 4.2 MMOL/L
POTASSIUM SERPL-SCNC: 5.1 MMOL/L (ref 3.5–5.3)
PRODUCT BLOOD TYPE: 1700
PRODUCT CODE: NORMAL
PROT FLD-MCNC: 3.7 G/DL
PROTHROMBIN TIME: 14.8 SECONDS (ref 9.8–12.8)
RBC # BLD AUTO: 2.93 X10*6/UL (ref 4–5.2)
RBC # BLD AUTO: 3.2 X10*6/UL (ref 4–5.2)
RBC # BLD AUTO: 3.22 X10*6/UL (ref 4–5.2)
RBC # FLD MANUAL: 1000 /UL
RH FACTOR (ANTIGEN D): NORMAL
SODIUM FLD-SCNC: 142 MMOL/L
SODIUM SERPL-SCNC: 140 MMOL/L (ref 136–145)
TOTAL CELLS COUNTED FLD: 100
UNIT ABO: NORMAL
UNIT NUMBER: NORMAL
UNIT RH: NORMAL
UNIT VOLUME: 350
WBC # BLD AUTO: 12.7 X10*3/UL (ref 4.4–11.3)
WBC # BLD AUTO: 12.8 X10*3/UL (ref 4.4–11.3)
WBC # BLD AUTO: 12.8 X10*3/UL (ref 4.4–11.3)
WBC # FLD MANUAL: 273 /UL
XM INTEP: NORMAL

## 2024-06-13 PROCEDURE — 1210000001 HC SEMI-PRIVATE ROOM DAILY

## 2024-06-13 PROCEDURE — 85027 COMPLETE CBC AUTOMATED: CPT | Performed by: STUDENT IN AN ORGANIZED HEALTH CARE EDUCATION/TRAINING PROGRAM

## 2024-06-13 PROCEDURE — 82150 ASSAY OF AMYLASE: CPT | Performed by: NURSE PRACTITIONER

## 2024-06-13 PROCEDURE — 85027 COMPLETE CBC AUTOMATED: CPT

## 2024-06-13 PROCEDURE — 80069 RENAL FUNCTION PANEL: CPT | Performed by: STUDENT IN AN ORGANIZED HEALTH CARE EDUCATION/TRAINING PROGRAM

## 2024-06-13 PROCEDURE — 84302 ASSAY OF SWEAT SODIUM: CPT | Performed by: NURSE PRACTITIONER

## 2024-06-13 PROCEDURE — 89050 BODY FLUID CELL COUNT: CPT | Performed by: NURSE PRACTITIONER

## 2024-06-13 PROCEDURE — 0W9G3ZZ DRAINAGE OF PERITONEAL CAVITY, PERCUTANEOUS APPROACH: ICD-10-PCS | Performed by: NURSE PRACTITIONER

## 2024-06-13 PROCEDURE — 84157 ASSAY OF PROTEIN OTHER: CPT | Performed by: NURSE PRACTITIONER

## 2024-06-13 PROCEDURE — 8010000001 HC DIALYSIS - HEMODIALYSIS PER DAY

## 2024-06-13 PROCEDURE — 99221 1ST HOSP IP/OBS SF/LOW 40: CPT | Performed by: INTERNAL MEDICINE

## 2024-06-13 PROCEDURE — 82947 ASSAY GLUCOSE BLOOD QUANT: CPT

## 2024-06-13 PROCEDURE — 2500000001 HC RX 250 WO HCPCS SELF ADMINISTERED DRUGS (ALT 637 FOR MEDICARE OP)

## 2024-06-13 PROCEDURE — P9016 RBC LEUKOCYTES REDUCED: HCPCS

## 2024-06-13 PROCEDURE — 85384 FIBRINOGEN ACTIVITY: CPT

## 2024-06-13 PROCEDURE — 49083 ABD PARACENTESIS W/IMAGING: CPT

## 2024-06-13 PROCEDURE — 2500000001 HC RX 250 WO HCPCS SELF ADMINISTERED DRUGS (ALT 637 FOR MEDICARE OP): Performed by: STUDENT IN AN ORGANIZED HEALTH CARE EDUCATION/TRAINING PROGRAM

## 2024-06-13 PROCEDURE — 36415 COLL VENOUS BLD VENIPUNCTURE: CPT | Performed by: STUDENT IN AN ORGANIZED HEALTH CARE EDUCATION/TRAINING PROGRAM

## 2024-06-13 PROCEDURE — 84133 ASSAY OF URINE POTASSIUM: CPT | Performed by: NURSE PRACTITIONER

## 2024-06-13 PROCEDURE — 36430 TRANSFUSION BLD/BLD COMPNT: CPT

## 2024-06-13 PROCEDURE — 2500000002 HC RX 250 W HCPCS SELF ADMINISTERED DRUGS (ALT 637 FOR MEDICARE OP, ALT 636 FOR OP/ED): Performed by: STUDENT IN AN ORGANIZED HEALTH CARE EDUCATION/TRAINING PROGRAM

## 2024-06-13 PROCEDURE — 87205 SMEAR GRAM STAIN: CPT | Performed by: NURSE PRACTITIONER

## 2024-06-13 PROCEDURE — 88112 CYTOPATH CELL ENHANCE TECH: CPT | Mod: TC,MCY | Performed by: NURSE PRACTITIONER

## 2024-06-13 PROCEDURE — 90935 HEMODIALYSIS ONE EVALUATION: CPT | Performed by: INTERNAL MEDICINE

## 2024-06-13 PROCEDURE — 85610 PROTHROMBIN TIME: CPT

## 2024-06-13 PROCEDURE — 82042 OTHER SOURCE ALBUMIN QUAN EA: CPT | Performed by: NURSE PRACTITIONER

## 2024-06-13 PROCEDURE — 30233N1 TRANSFUSION OF NONAUTOLOGOUS RED BLOOD CELLS INTO PERIPHERAL VEIN, PERCUTANEOUS APPROACH: ICD-10-PCS | Performed by: OBSTETRICS & GYNECOLOGY

## 2024-06-13 RX ORDER — AMOXICILLIN 250 MG
2 CAPSULE ORAL 2 TIMES DAILY
Status: DISCONTINUED | OUTPATIENT
Start: 2024-06-13 | End: 2024-06-14 | Stop reason: HOSPADM

## 2024-06-13 RX ORDER — MEDROXYPROGESTERONE ACETATE 10 MG/1
20 TABLET ORAL EVERY 8 HOURS
Status: DISCONTINUED | OUTPATIENT
Start: 2024-06-13 | End: 2024-06-14 | Stop reason: HOSPADM

## 2024-06-13 RX ORDER — POLYETHYLENE GLYCOL 3350 17 G/17G
17 POWDER, FOR SOLUTION ORAL DAILY
Status: DISCONTINUED | OUTPATIENT
Start: 2024-06-13 | End: 2024-06-14 | Stop reason: HOSPADM

## 2024-06-13 ASSESSMENT — PAIN DESCRIPTION - DESCRIPTORS
DESCRIPTORS: CRAMPING

## 2024-06-13 ASSESSMENT — PAIN DESCRIPTION - ORIENTATION: ORIENTATION: LOWER

## 2024-06-13 ASSESSMENT — PAIN SCALES - GENERAL
PAINLEVEL_OUTOF10: 8
PAINLEVEL_OUTOF10: 4
PAINLEVEL_OUTOF10: 4
PAINLEVEL_OUTOF10: 3
PAINLEVEL_OUTOF10: 5 - MODERATE PAIN
PAINLEVEL_OUTOF10: 0 - NO PAIN
PAINLEVEL_OUTOF10: 10 - WORST POSSIBLE PAIN
PAINLEVEL_OUTOF10: 8

## 2024-06-13 ASSESSMENT — PAIN - FUNCTIONAL ASSESSMENT: PAIN_FUNCTIONAL_ASSESSMENT: 0-10

## 2024-06-13 ASSESSMENT — PAIN DESCRIPTION - LOCATION: LOCATION: ABDOMEN

## 2024-06-13 NOTE — CARE PLAN
The patient's goals for the shift include rest; decrease in VB    The clinical goals for the shift include decrease in VB      Problem: Pain - Adult  Goal: Verbalizes/displays adequate comfort level or baseline comfort level  Outcome: Progressing     Problem: Safety - Adult  Goal: Free from fall injury  Outcome: Progressing     Problem: Discharge Planning  Goal: Discharge to home or other facility with appropriate resources  Outcome: Progressing     Problem: Chronic Conditions and Co-morbidities  Goal: Patient's chronic conditions and co-morbidity symptoms are monitored and maintained or improved  Outcome: Progressing

## 2024-06-13 NOTE — PROGRESS NOTES
"Gynecology Progress Note    Assessment/Plan   Dodie Contreras is a 54 y.o.  at Unknown. MICHAEL: Not found..     55 yo F with ESRD on HD 2/2 T2DM and HTN, anemia of chronic disease, bilateral foot ulcers s/p debridement and bone biopsy (), hx of stroke () admitted for acute episode of vaginal bleeding.     Vaginal bleeding  Etiology unclear at this time. Pt s/p extensive PMB workup at OSH in May for initial episode including cervical biopsies showing acute and chronic inflammation, negative EMB (), and ASCUS/hrHPV(-) Pap (). Per report at that time, cervix \"grossly abnormal.\" On admission, unable to visualize cervix given amount of bleeding and pt inability to tolerate exam. Transabdominal ultrasound showed thickened endometrial stripe; heterogenous myometrium with diffuse calcifications, and small-moderate pelvic ascites. Unable to visualize ovaries. Subsequent CT A/P showed large-volume ascites, irregularly thickened anterior bladder wall, and tortuous vessels in the region of the uterus and vagina consistent with pelvic congestion syndrome. Unclear if bleeding urologic in origin.  - Plan for repeat pelvic exam this morning   - NPO @ [facility] for possible exam under anesthesia with hysteroscopy and/or cystoscopy in AM. -Consider PAT and Urology cs      Anemia  - Acute on chronic with down-trending hemoglobin after acute episode of vaginal bleeding. Vaginal bleeding now minimal after first Provera dose and vitals hemodynamically normal. Likely equilibrating.  - s/p DDAVP in ED for concern for uremic bleeding  - s/p 1 U pRBCs, post-transfusion CBC pending   - Follow-up AM CBC  - Continue pad counts     Ascites  - Possibly nephrogenic in setting of ESRD. s/p abdominal paracentesis on  with no malignant cells identified on cytology. CT A/P without evidence of ovarian pathology or peritoneal disease. LFTs unremarkable.  - Plan for IR-guided drainage in AM     ESRD  - Reports no longer makes urine. Has been " "undergoing dialysis for approximately 1.5 years.  - For dialysis in AM to be coordinated by nephrology  - Follow-up Nephrology recs     HTN  - Continue amlodipine, carvedilol     T2DM  - q4h BG checks while NPO     Cardiac  - Asymptomatic from a cardiac standpoint with normal heart rate.   - CT A/P with mitral valve calcifications and four-chamber cardiomegaly.   - ECHO (2020) without evidence of LV hypertrophy or valvular abnormalities. EF 65%.  - Consider repeat TTE in AM    Constipation  -chronic, will start Miralax and Pericolace     To be seen and d/w Dr. Huitron,   Angela Fam MD, PGY-2     Principal Problem:    Vaginal bleeding    Problem List       No episode was linked to this visit.            Subjective   Pt is doing okay, vaginal bleeding has improved since given Provera. Continues to have abdominal pain and distension and reported \"fecal compaction\". Currently, NPO, denies overnight emesis.     Objective   Allergies:   Latex    Last Vitals:  Temp Pulse Resp BP MAP Pulse Ox   37.3 °C (99.1 °F) 73 18 112/61   98 %     Vitals Min/Max Last 24 Hours:  Temp  Min: 36.3 °C (97.3 °F)  Max: 37.8 °C (100 °F)  Pulse  Min: 73  Max: 88  Resp  Min: 18  Max: 18  BP  Min: 100/65  Max: 163/81    Intake/Output:     Intake/Output Summary (Last 24 hours) at 6/13/2024 0643  Last data filed at 6/13/2024 0613  Gross per 24 hour   Intake 350 ml   Output --   Net 350 ml       Physical Exam:  General: no acute distress   HEENT: normocephalic, atraumatic   Cards: RRR   Pulm: lungs CTAB   Abdomen: firm, distended, diffuse lower abdomen tenderness   : bright red streak on mess underwear  Neuro: no focal deficits       Lab Data:  .  Results for orders placed or performed during the hospital encounter of 06/12/24 (from the past 24 hour(s))   CBC and Auto Differential   Result Value Ref Range    WBC 15.0 (H) 4.4 - 11.3 x10*3/uL    nRBC 0.2 (H) 0.0 - 0.0 /100 WBCs    RBC 3.50 (L) 4.00 - 5.20 x10*6/uL    Hemoglobin 8.6 (L) 12.0 - " 16.0 g/dL    Hematocrit 26.3 (L) 36.0 - 46.0 %    MCV 75 (L) 80 - 100 fL    MCH 24.6 (L) 26.0 - 34.0 pg    MCHC 32.7 32.0 - 36.0 g/dL    RDW 18.1 (H) 11.5 - 14.5 %    Platelets 283 150 - 450 x10*3/uL    Neutrophils % 70.7 40.0 - 80.0 %    Immature Granulocytes %, Automated 0.9 0.0 - 0.9 %    Lymphocytes % 14.3 13.0 - 44.0 %    Monocytes % 11.6 2.0 - 10.0 %    Eosinophils % 1.8 0.0 - 6.0 %    Basophils % 0.7 0.0 - 2.0 %    Neutrophils Absolute 10.60 (H) 1.20 - 7.70 x10*3/uL    Immature Granulocytes Absolute, Automated 0.13 0.00 - 0.70 x10*3/uL    Lymphocytes Absolute 2.14 1.20 - 4.80 x10*3/uL    Monocytes Absolute 1.73 (H) 0.10 - 1.00 x10*3/uL    Eosinophils Absolute 0.27 0.00 - 0.70 x10*3/uL    Basophils Absolute 0.10 0.00 - 0.10 x10*3/uL   Comprehensive metabolic panel   Result Value Ref Range    Glucose 78 74 - 99 mg/dL    Sodium 140 136 - 145 mmol/L    Potassium 4.7 3.5 - 5.3 mmol/L    Chloride 96 (L) 98 - 107 mmol/L    Bicarbonate 24 21 - 32 mmol/L    Anion Gap 25 (H) 10 - 20 mmol/L    Urea Nitrogen 69 (H) 6 - 23 mg/dL    Creatinine 9.69 (H) 0.50 - 1.05 mg/dL    eGFR 4 (L) >60 mL/min/1.73m*2    Calcium 7.8 (L) 8.6 - 10.6 mg/dL    Albumin 3.2 (L) 3.4 - 5.0 g/dL    Alkaline Phosphatase 284 (H) 33 - 110 U/L    Total Protein 7.1 6.4 - 8.2 g/dL    AST 13 9 - 39 U/L    Bilirubin, Total 0.5 0.0 - 1.2 mg/dL    ALT 7 7 - 45 U/L   Protime-INR   Result Value Ref Range    Protime 14.5 (H) 9.8 - 12.8 seconds    INR 1.3 (H) 0.9 - 1.1   SST TOP   Result Value Ref Range    Extra Tube Hold for add-ons.    Hepatitis B Surface Antibody   Result Value Ref Range    Hepatitis B Surface .5 (H) <10.0 mIU/mL   Hepatitis B Surface Antigen   Result Value Ref Range    Hepatitis B Surface AG Nonreactive Nonreactive   Type And Screen   Result Value Ref Range    ABO TYPE B     Rh TYPE NEG     ANTIBODY SCREEN NEG    CBC and Auto Differential   Result Value Ref Range    WBC 15.2 (H) 4.4 - 11.3 x10*3/uL    nRBC 0.1 (H) 0.0 - 0.0 /100 WBCs     RBC 3.18 (L) 4.00 - 5.20 x10*6/uL    Hemoglobin 7.8 (L) 12.0 - 16.0 g/dL    Hematocrit 24.4 (L) 36.0 - 46.0 %    MCV 77 (L) 80 - 100 fL    MCH 24.5 (L) 26.0 - 34.0 pg    MCHC 32.0 32.0 - 36.0 g/dL    RDW 18.4 (H) 11.5 - 14.5 %    Platelets 294 150 - 450 x10*3/uL    Neutrophils % 67.4 40.0 - 80.0 %    Immature Granulocytes %, Automated 0.7 0.0 - 0.9 %    Lymphocytes % 17.9 13.0 - 44.0 %    Monocytes % 11.5 2.0 - 10.0 %    Eosinophils % 1.9 0.0 - 6.0 %    Basophils % 0.6 0.0 - 2.0 %    Neutrophils Absolute 10.23 (H) 1.20 - 7.70 x10*3/uL    Immature Granulocytes Absolute, Automated 0.11 0.00 - 0.70 x10*3/uL    Lymphocytes Absolute 2.72 1.20 - 4.80 x10*3/uL    Monocytes Absolute 1.74 (H) 0.10 - 1.00 x10*3/uL    Eosinophils Absolute 0.29 0.00 - 0.70 x10*3/uL    Basophils Absolute 0.09 0.00 - 0.10 x10*3/uL   POCT GLUCOSE   Result Value Ref Range    POCT Glucose 72 (L) 74 - 99 mg/dL   Prepare RBC: 1 Units   Result Value Ref Range    PRODUCT CODE Z2685U04     Unit Number [id number]     Unit ABO B     Unit RH NEG     XM INTEP COMP     Dispense Status TR     Blood Expiration Date July 15, 2024 23:59 EDT     PRODUCT BLOOD TYPE 1700     UNIT VOLUME 350    POCT GLUCOSE   Result Value Ref Range    POCT Glucose 126 (H) 74 - 99 mg/dL   POCT GLUCOSE   Result Value Ref Range    POCT Glucose 94 74 - 99 mg/dL   CBC   Result Value Ref Range    WBC 12.7 (H) 4.4 - 11.3 x10*3/uL    nRBC 0.2 (H) 0.0 - 0.0 /100 WBCs    RBC 2.93 (L) 4.00 - 5.20 x10*6/uL    Hemoglobin 7.1 (L) 12.0 - 16.0 g/dL    Hematocrit 23.4 (L) 36.0 - 46.0 %    MCV 80 80 - 100 fL    MCH 24.2 (L) 26.0 - 34.0 pg    MCHC 30.3 (L) 32.0 - 36.0 g/dL    RDW 18.6 (H) 11.5 - 14.5 %    Platelets 276 150 - 450 x10*3/uL   VERIFY ABO/Rh Group Test   Result Value Ref Range    ABO TYPE B     Rh TYPE NEG    POCT GLUCOSE   Result Value Ref Range    POCT Glucose 89 74 - 99 mg/dL

## 2024-06-13 NOTE — CONSULTS
CONSULT: NEPHROLOGY SERVICE    REASON FOR CONSULT: ESKD  Admit Date: 6/12/2024 12:20 PM       HPI: Patient is a 54 y.o. female admitted 6/12/2024 with h/o ESKD on HD, T2DM and HTN, anemia, bilateral foot ulcers s/p debridement and bone biopsy (1/30), stroke, admitted for acute episode of vaginal bleeding, also with pain  No SOB or edema    Dialysis on TTS schedule, nephrologist Dr Bridges, dialysis care center in [place]    No past medical history on file.  Allergies: Latex     No past surgical history on file.    No family history on file.    Social History  She reports that she has never smoked. She has never used smokeless tobacco. She reports that she does not drink alcohol and does not use drugs.    Review of Systems  As above    CURRENT HOSP MEDS:    Current Facility-Administered Medications:     acetaminophen (Tylenol) tablet 650 mg, 650 mg, oral, q6h PRN, Lavern Allen MD    amLODIPine (Norvasc) tablet 10 mg, 10 mg, oral, Daily, Lavern Allen MD, 10 mg at 06/12/24 1848    aspirin chewable tablet 81 mg, 81 mg, oral, Daily, Lavern Allen MD    atorvastatin (Lipitor) tablet 80 mg, 80 mg, oral, Nightly, Lavern Allen MD, 80 mg at 06/12/24 2038    carvedilol (Coreg) tablet 25 mg, 25 mg, oral, BID, Lavern Allen MD, 25 mg at 06/12/24 2038    desmopressin (DDAVP) 24.4 mcg in sodium chloride 0.9% 50 mL IV, 0.3 mcg/kg, intravenous, Once, Lavern Allen MD    dextrose 50 % injection 12.5 g, 12.5 g, intravenous, q15 min PRN, Lavern Allen MD    dextrose 50 % injection 12.5 g, 12.5 g, intravenous, q15 min PRN, Lavern Allen MD    dextrose 50 % injection 25 g, 25 g, intravenous, q15 min PRN, Lavern Allen MD    dextrose 50 % injection 25 g, 25 g, intravenous, q15 min PRN, Lavern Allen MD    glucagon (Glucagen) injection 1 mg, 1 mg, intramuscular, q15 min PRN, Lavern Allen MD    glucagon (Glucagen) injection 1 mg, 1 mg, intramuscular, q15 min PRN, Lavern Allen MD    glucagon (Glucagen) injection 1 mg, 1 mg,  "intramuscular, q15 min PRN, Lavern Allen MD    glucagon (Glucagen) injection 1 mg, 1 mg, intramuscular, q15 min PRN, Lavern Allen MD    medroxyPROGESTERone (Provera) tablet 20 mg, 20 mg, oral, q8h, Willie Aguilera MD, 20 mg at 06/13/24 0516    metoclopramide (Reglan) tablet 10 mg, 10 mg, oral, q6h PRN **OR** metoclopramide (Reglan) injection 10 mg, 10 mg, intravenous, q6h PRN, Lavern Allen MD    ondansetron (Zofran) tablet 4 mg, 4 mg, oral, q6h PRN **OR** ondansetron (Zofran) injection 4 mg, 4 mg, intravenous, q6h PRN, Lavern Allen MD    oxyCODONE (Roxicodone) immediate release tablet 5 mg, 5 mg, oral, q6h PRN, Lavern Allen MD, 5 mg at 06/12/24 2043    sevelamer carbonate (Renvela) tablet 800 mg, 800 mg, oral, TID, Lavern Allen MD, 800 mg at 06/12/24 2225     PHYSICAL EXAM:  /61   Pulse 73   Temp 36.4 °C (97.5 °F) (Temporal)   Resp 18   Ht 1.651 m (5' 5\")   Wt 80.7 kg (178 lb)   SpO2 98%   BMI 29.62 kg/m²     Intake/Output Summary (Last 24 hours) at 6/13/2024 0856  Last data filed at 6/13/2024 0747  Gross per 24 hour   Intake 550 ml   Output --   Net 550 ml     Gen: AAO, NAD  Neck: No JVD  Cardiac: RRR  Resp: clear BS  Abd: Soft, non tender, +BS, non distended   Ext: No edema   Access: RIJ TDC  Neuro: moves 4 ext  Peripheral Pulses: Capillary refill <2secs, strong peripheral pulses.  Skin: Skin color, texture, turgor normal, no suspicious rashes or lesions.    LABS:   Results for orders placed or performed during the hospital encounter of 06/12/24 (from the past 24 hour(s))   CBC and Auto Differential   Result Value Ref Range    WBC 15.0 (H) 4.4 - 11.3 x10*3/uL    nRBC 0.2 (H) 0.0 - 0.0 /100 WBCs    RBC 3.50 (L) 4.00 - 5.20 x10*6/uL    Hemoglobin 8.6 (L) 12.0 - 16.0 g/dL    Hematocrit 26.3 (L) 36.0 - 46.0 %    MCV 75 (L) 80 - 100 fL    MCH 24.6 (L) 26.0 - 34.0 pg    MCHC 32.7 32.0 - 36.0 g/dL    RDW 18.1 (H) 11.5 - 14.5 %    Platelets 283 150 - 450 x10*3/uL    Neutrophils % 70.7 40.0 - 80.0 % "    Immature Granulocytes %, Automated 0.9 0.0 - 0.9 %    Lymphocytes % 14.3 13.0 - 44.0 %    Monocytes % 11.6 2.0 - 10.0 %    Eosinophils % 1.8 0.0 - 6.0 %    Basophils % 0.7 0.0 - 2.0 %    Neutrophils Absolute 10.60 (H) 1.20 - 7.70 x10*3/uL    Immature Granulocytes Absolute, Automated 0.13 0.00 - 0.70 x10*3/uL    Lymphocytes Absolute 2.14 1.20 - 4.80 x10*3/uL    Monocytes Absolute 1.73 (H) 0.10 - 1.00 x10*3/uL    Eosinophils Absolute 0.27 0.00 - 0.70 x10*3/uL    Basophils Absolute 0.10 0.00 - 0.10 x10*3/uL   Comprehensive metabolic panel   Result Value Ref Range    Glucose 78 74 - 99 mg/dL    Sodium 140 136 - 145 mmol/L    Potassium 4.7 3.5 - 5.3 mmol/L    Chloride 96 (L) 98 - 107 mmol/L    Bicarbonate 24 21 - 32 mmol/L    Anion Gap 25 (H) 10 - 20 mmol/L    Urea Nitrogen 69 (H) 6 - 23 mg/dL    Creatinine 9.69 (H) 0.50 - 1.05 mg/dL    eGFR 4 (L) >60 mL/min/1.73m*2    Calcium 7.8 (L) 8.6 - 10.6 mg/dL    Albumin 3.2 (L) 3.4 - 5.0 g/dL    Alkaline Phosphatase 284 (H) 33 - 110 U/L    Total Protein 7.1 6.4 - 8.2 g/dL    AST 13 9 - 39 U/L    Bilirubin, Total 0.5 0.0 - 1.2 mg/dL    ALT 7 7 - 45 U/L   Protime-INR   Result Value Ref Range    Protime 14.5 (H) 9.8 - 12.8 seconds    INR 1.3 (H) 0.9 - 1.1   SST TOP   Result Value Ref Range    Extra Tube Hold for add-ons.    Hepatitis B Surface Antibody   Result Value Ref Range    Hepatitis B Surface .5 (H) <10.0 mIU/mL   Hepatitis B Surface Antigen   Result Value Ref Range    Hepatitis B Surface AG Nonreactive Nonreactive   Type And Screen   Result Value Ref Range    ABO TYPE B     Rh TYPE NEG     ANTIBODY SCREEN NEG    CBC and Auto Differential   Result Value Ref Range    WBC 15.2 (H) 4.4 - 11.3 x10*3/uL    nRBC 0.1 (H) 0.0 - 0.0 /100 WBCs    RBC 3.18 (L) 4.00 - 5.20 x10*6/uL    Hemoglobin 7.8 (L) 12.0 - 16.0 g/dL    Hematocrit 24.4 (L) 36.0 - 46.0 %    MCV 77 (L) 80 - 100 fL    MCH 24.5 (L) 26.0 - 34.0 pg    MCHC 32.0 32.0 - 36.0 g/dL    RDW 18.4 (H) 11.5 - 14.5 %     Platelets 294 150 - 450 x10*3/uL    Neutrophils % 67.4 40.0 - 80.0 %    Immature Granulocytes %, Automated 0.7 0.0 - 0.9 %    Lymphocytes % 17.9 13.0 - 44.0 %    Monocytes % 11.5 2.0 - 10.0 %    Eosinophils % 1.9 0.0 - 6.0 %    Basophils % 0.6 0.0 - 2.0 %    Neutrophils Absolute 10.23 (H) 1.20 - 7.70 x10*3/uL    Immature Granulocytes Absolute, Automated 0.11 0.00 - 0.70 x10*3/uL    Lymphocytes Absolute 2.72 1.20 - 4.80 x10*3/uL    Monocytes Absolute 1.74 (H) 0.10 - 1.00 x10*3/uL    Eosinophils Absolute 0.29 0.00 - 0.70 x10*3/uL    Basophils Absolute 0.09 0.00 - 0.10 x10*3/uL   POCT GLUCOSE   Result Value Ref Range    POCT Glucose 72 (L) 74 - 99 mg/dL   Prepare RBC: 1 Units   Result Value Ref Range    PRODUCT CODE [id number]     Unit Number [id number]     Unit ABO B     Unit RH NEG     XM INTEP COMP     Dispense Status TR     Blood Expiration Date July 15, 2024 23:59 EDT     PRODUCT BLOOD TYPE 1700     UNIT VOLUME 350    POCT GLUCOSE   Result Value Ref Range    POCT Glucose 126 (H) 74 - 99 mg/dL   POCT GLUCOSE   Result Value Ref Range    POCT Glucose 94 74 - 99 mg/dL   CBC   Result Value Ref Range    WBC 12.7 (H) 4.4 - 11.3 x10*3/uL    nRBC 0.2 (H) 0.0 - 0.0 /100 WBCs    RBC 2.93 (L) 4.00 - 5.20 x10*6/uL    Hemoglobin 7.1 (L) 12.0 - 16.0 g/dL    Hematocrit 23.4 (L) 36.0 - 46.0 %    MCV 80 80 - 100 fL    MCH 24.2 (L) 26.0 - 34.0 pg    MCHC 30.3 (L) 32.0 - 36.0 g/dL    RDW 18.6 (H) 11.5 - 14.5 %    Platelets 276 150 - 450 x10*3/uL   VERIFY ABO/Rh Group Test   Result Value Ref Range    ABO TYPE B     Rh TYPE NEG    POCT GLUCOSE   Result Value Ref Range    POCT Glucose 89 74 - 99 mg/dL   CBC   Result Value Ref Range    WBC 12.8 (H) 4.4 - 11.3 x10*3/uL    nRBC 0.2 (H) 0.0 - 0.0 /100 WBCs    RBC 3.20 (L) 4.00 - 5.20 x10*6/uL    Hemoglobin 7.7 (L) 12.0 - 16.0 g/dL    Hematocrit 26.0 (L) 36.0 - 46.0 %    MCV 81 80 - 100 fL    MCH 24.1 (L) 26.0 - 34.0 pg    MCHC 29.6 (L) 32.0 - 36.0 g/dL    RDW 18.6 (H) 11.5 - 14.5 %     Platelets 271 150 - 450 x10*3/uL       DATA:   Diagnostic tests reviewed for today's visit:    Labs and meds    ASSESSMENT AND PLAN:  - ESKD on HD: euvolemic  Seen on HD now tolerating well, 3h/1.5L UF  BP: controlled  Lowering Hb, now 7.7    PLAN:  - routine HD now, next session Saturday keeping her TTS  schedule  - adding epogen, tranfuse prn to keep Hb >7    Greatly appreciate the opportunity to assist in the care of this patient. Will continue to follow.     Signature: Suresh Alex MD  Division of Nephrology and Hypertension

## 2024-06-13 NOTE — CARE PLAN
Problem: Pain - Adult  Goal: Verbalizes/displays adequate comfort level or baseline comfort level  Outcome: Progressing     Problem: Safety - Adult  Goal: Free from fall injury  Outcome: Progressing     Problem: Discharge Planning  Goal: Discharge to home or other facility with appropriate resources  Outcome: Progressing     Problem: Chronic Conditions and Co-morbidities  Goal: Patient's chronic conditions and co-morbidity symptoms are monitored and maintained or improved  Outcome: Progressing   The patient's goals for the shift include will have a pain score of 4/10 or below today    The clinical goals for the shift include will help decrease pt's pain score to 4/10 or below today    Pt is s/p dialysis today. Her abdominal pain continues and she took Oxycodone for that. She has been NPO. Her H/H today is 7.7/26. We redressed her two pressure ulcers, one on her left leg and the other on her left foot. Her vaginal blooding is a small amount today. Stable.

## 2024-06-13 NOTE — NURSING NOTE
Report to Receiving RN:    Report To: Sari BUSTAMANTE   Time Report Called: [id number]  Hand-Off Communication: HD terminated 50 min early by patient. States she wants to clean herself up, refused to allow up to help her. Dr Alex made aware. Removed 629 mL. Post /73 HR 73  Complications During Treatment: No  Ultrafiltration Treatment: Yes  Medications Administered During Dialysis: No  Blood Products Administered During Dialysis: No  Labs Sent During Dialysis: Yes  Heparin Drip Rate Changes: N/A  Dialysis Catheter Dressing: Changed - C/D/I  Last Dressing Change: 6.13.24

## 2024-06-13 NOTE — SIGNIFICANT EVENT
"Hgb downtrending (8.6 > 7.8 > 7.1). Pt reporting lightheadedness on laying in bed. Bleeding minimal on pad s/p 1 x dose of Provera.    Visit Vitals  /68   Pulse 75   Temp 37.5 °C (99.5 °F) (Temporal)   Resp 18     General: laying in bed in no acute distress  Abd: soft, mild tenderness to palpation diffusely, +fluid wave  : small streak of bright red blood on pad  Neuro: alert and oriented    A/P  55 yo F with ESRD on HD 2/2 T2DM and HTN, anemia of chronic disease, bilateral foot ulcers s/p debridement and bone biopsy (1/30), hx of stroke (2017) presented for acute episode of vaginal bleeding.    Vaginal bleeding  Etiology unclear at this time. Pt s/p extensive PMB workup at OSH in May for initial episode including cervical biopsies showing acute and chronic inflammation, negative EMB (6/4), and ASCUS/hrHPV(-) Pap (5/29). Per report at that time, cervix \"grossly abnormal.\" On admission, unable to visualize cervix given amount of bleeding and pt inability to tolerate exam. Transabdominal ultrasound showed thickened endometrial stripe; heterogenous myometrium with diffuse calcifications, and small-moderate pelvic ascites. Unable to visualize ovaries. Subsequent CT A/P showed large-volume ascites, irregularly thickened anterior bladder wall, and tortuous vessels in the region of the uterus and vagina consistent with pelvic congestion syndrome. Unclear if bleeding urologic in origin.  - Continue Provera 20 mg TID  - Plan for repeat pelvic exam in AM  - NPO @ [facility] for possible exam under anesthesia with hysteroscopy and/or cystoscopy in AM    Anemia  - Acute on chronic with down-trending hemoglobin after acute episode of vaginal bleeding. Vaginal bleeding now minimal after first Provera dose and vitals hemodynamically normal. Likely equilibrating.  - s/p DDAVP in ED for concern for uremic bleeding  - Transfuse 1 U pRBCs  - Follow-up AM CBC  - Continue pad counts    Ascites  - Possibly nephrogenic in setting of ESRD. " s/p abdominal paracentesis on 5/6 with no malignant cells identified on cytology. CT A/P without evidence of ovarian pathology or peritoneal disease. LFTs unremarkable.  - For IR-guided drainage in AM    ESRD  - Reports no longer makes urine. Has been undergoing dialysis for approximately 1.5 years.  - For dialysis in AM to be coordinated by nephrology  - Follow-up Nephrology recs    HTN  - Continue amlodipine, carvedilol    T2DM  - q4h BG checks while NPO    Cardiac  - Asymptomatic from a cardiac standpoint with normal heart rate.   - CT A/P with mitral valve calcifications and four-chamber cardiomegaly.   - ECHO (2020) without evidence of LV hypertrophy or valvular abnormalities. EF 65%.  - Consider repeat TTE in AM    Discussed with Dr. Ryder Aguilera MD  Gynecology (p. [phone])

## 2024-06-13 NOTE — NURSING NOTE
Report from Sending RN:    Report From: Rosalee ( DIANNA)  Recent Surgery of Procedure: No  Baseline Level of Consciousness (LOC): a/o x 4  Oxygen Use: No  Type: none  Diabetic: Yes, 89  Last BP Med Given Day of Dialysis: none  Last Pain Med Given: none  Lab Tests to be Obtained with Dialysis: Yes, CBC, RFP  Blood Transfusion to be Given During Dialysis: No  Available IV Access: Yes  Medications to be Administered During Dialysis: No  Continuous IV Infusion Running: No  Restraints on Currently or in the Last 24 Hours: No  Hand-Off Communication: No acute overnight or morning events; vss; Pt is currently NPO; Pt will need labs; Pt is a full code; Lita Simms RN.  Dialysis Catheter Dressing: right chest port  Last Dressing Change:

## 2024-06-13 NOTE — POST-PROCEDURE NOTE
INTERVENTIONAL RADIOLOGY ADVANCED PRACTICE PROCEDURE  [facility]    A time out was performed and Right Hemiabdomen was examined with US and appropriate entry point was confirmed and marked.   The patient was prepped and draped in a sterile manner, 1% lidocaine was used to anesthesize the skin and subcutaneous tissue.   A 5F Centesis needle was then introduced through the skin into the peritoneal space, the centesis catheter was then threaded without difficulty.   1450 ml of yellow fluid was removed without difficulty. The catheter was then removed.   No immediate complications were noted during and immediately following the procedure.

## 2024-06-14 ENCOUNTER — APPOINTMENT (OUTPATIENT)
Dept: CARDIOLOGY | Facility: HOSPITAL | Age: 54
End: 2024-06-14
Payer: COMMERCIAL

## 2024-06-14 ENCOUNTER — APPOINTMENT (OUTPATIENT)
Dept: CARDIOLOGY | Facility: HOSPITAL | Age: 54
DRG: 919 | End: 2024-06-14
Payer: COMMERCIAL

## 2024-06-14 VITALS
DIASTOLIC BLOOD PRESSURE: 73 MMHG | HEIGHT: 65 IN | BODY MASS INDEX: 29.66 KG/M2 | WEIGHT: 178 LBS | OXYGEN SATURATION: 98 % | RESPIRATION RATE: 16 BRPM | TEMPERATURE: 98.1 F | SYSTOLIC BLOOD PRESSURE: 143 MMHG | HEART RATE: 68 BPM

## 2024-06-14 LAB
ANION GAP SERPL CALC-SCNC: 25 MMOL/L (ref 10–20)
ATRIAL RATE: 68 BPM
BUN SERPL-MCNC: 61 MG/DL (ref 6–23)
CALCIUM SERPL-MCNC: 7.9 MG/DL (ref 8.6–10.6)
CHLORIDE SERPL-SCNC: 97 MMOL/L (ref 98–107)
CO2 SERPL-SCNC: 23 MMOL/L (ref 21–32)
CREAT SERPL-MCNC: 9.05 MG/DL (ref 0.5–1.05)
EGFRCR SERPLBLD CKD-EPI 2021: 5 ML/MIN/1.73M*2
ERYTHROCYTE [DISTWIDTH] IN BLOOD BY AUTOMATED COUNT: 19 % (ref 11.5–14.5)
GLUCOSE BLD MANUAL STRIP-MCNC: 154 MG/DL (ref 74–99)
GLUCOSE BLD MANUAL STRIP-MCNC: 160 MG/DL (ref 74–99)
GLUCOSE BLD MANUAL STRIP-MCNC: 178 MG/DL (ref 74–99)
GLUCOSE SERPL-MCNC: 152 MG/DL (ref 74–99)
HCT VFR BLD AUTO: 26.5 % (ref 36–46)
HGB BLD-MCNC: 7.7 G/DL (ref 12–16)
MCH RBC QN AUTO: 23.8 PG (ref 26–34)
MCHC RBC AUTO-ENTMCNC: 29.1 G/DL (ref 32–36)
MCV RBC AUTO: 82 FL (ref 80–100)
NRBC BLD-RTO: 0 /100 WBCS (ref 0–0)
P AXIS: 49 DEGREES
P OFFSET: 197 MS
P ONSET: 148 MS
PLATELET # BLD AUTO: 248 X10*3/UL (ref 150–450)
POTASSIUM SERPL-SCNC: 4.8 MMOL/L (ref 3.5–5.3)
PR INTERVAL: 144 MS
Q ONSET: 220 MS
QRS COUNT: 11 BEATS
QRS DURATION: 72 MS
QT INTERVAL: 424 MS
QTC CALCULATION(BAZETT): 450 MS
QTC FREDERICIA: 442 MS
R AXIS: -13 DEGREES
RBC # BLD AUTO: 3.24 X10*6/UL (ref 4–5.2)
SODIUM SERPL-SCNC: 140 MMOL/L (ref 136–145)
T AXIS: 233 DEGREES
T OFFSET: 432 MS
VENTRICULAR RATE: 68 BPM
WBC # BLD AUTO: 14.5 X10*3/UL (ref 4.4–11.3)

## 2024-06-14 PROCEDURE — 82947 ASSAY GLUCOSE BLOOD QUANT: CPT

## 2024-06-14 PROCEDURE — 36415 COLL VENOUS BLD VENIPUNCTURE: CPT

## 2024-06-14 PROCEDURE — 2500000001 HC RX 250 WO HCPCS SELF ADMINISTERED DRUGS (ALT 637 FOR MEDICARE OP): Performed by: STUDENT IN AN ORGANIZED HEALTH CARE EDUCATION/TRAINING PROGRAM

## 2024-06-14 PROCEDURE — 2500000002 HC RX 250 W HCPCS SELF ADMINISTERED DRUGS (ALT 637 FOR MEDICARE OP, ALT 636 FOR OP/ED)

## 2024-06-14 PROCEDURE — 93010 ELECTROCARDIOGRAM REPORT: CPT | Performed by: INTERNAL MEDICINE

## 2024-06-14 PROCEDURE — 93005 ELECTROCARDIOGRAM TRACING: CPT

## 2024-06-14 PROCEDURE — 99238 HOSP IP/OBS DSCHRG MGMT 30/<: CPT

## 2024-06-14 PROCEDURE — 2500000002 HC RX 250 W HCPCS SELF ADMINISTERED DRUGS (ALT 637 FOR MEDICARE OP, ALT 636 FOR OP/ED): Performed by: STUDENT IN AN ORGANIZED HEALTH CARE EDUCATION/TRAINING PROGRAM

## 2024-06-14 PROCEDURE — 82374 ASSAY BLOOD CARBON DIOXIDE: CPT

## 2024-06-14 PROCEDURE — 99223 1ST HOSP IP/OBS HIGH 75: CPT

## 2024-06-14 PROCEDURE — 85027 COMPLETE CBC AUTOMATED: CPT

## 2024-06-14 RX ORDER — OXYCODONE HYDROCHLORIDE 5 MG/1
5 TABLET ORAL EVERY 6 HOURS PRN
Qty: 8 TABLET | Refills: 0 | Status: SHIPPED | OUTPATIENT
Start: 2024-06-14

## 2024-06-14 RX ORDER — INSULIN LISPRO 100 [IU]/ML
0-5 INJECTION, SOLUTION INTRAVENOUS; SUBCUTANEOUS
Status: DISCONTINUED | OUTPATIENT
Start: 2024-06-14 | End: 2024-06-14 | Stop reason: HOSPADM

## 2024-06-14 RX ORDER — AMLODIPINE BESYLATE 10 MG/1
10 TABLET ORAL DAILY
Qty: 30 TABLET | Refills: 0 | Status: SHIPPED | OUTPATIENT
Start: 2024-06-14 | End: 2024-07-14

## 2024-06-14 RX ORDER — MEDROXYPROGESTERONE ACETATE 10 MG/1
20 TABLET ORAL EVERY 8 HOURS
Qty: 180 TABLET | Refills: 0 | Status: SHIPPED | OUTPATIENT
Start: 2024-06-14 | End: 2024-07-14

## 2024-06-14 RX ORDER — NAPROXEN SODIUM 220 MG/1
81 TABLET, FILM COATED ORAL DAILY
Qty: 30 TABLET | Refills: 1 | Status: SHIPPED | OUTPATIENT
Start: 2024-06-15 | End: 2024-08-14

## 2024-06-14 RX ORDER — CARVEDILOL 25 MG/1
25 TABLET ORAL 2 TIMES DAILY
Qty: 60 TABLET | Refills: 1 | Status: SHIPPED | OUTPATIENT
Start: 2024-06-14 | End: 2024-08-13

## 2024-06-14 RX ORDER — ATORVASTATIN CALCIUM 80 MG/1
80 TABLET, FILM COATED ORAL NIGHTLY
Qty: 30 TABLET | Refills: 1 | Status: SHIPPED | OUTPATIENT
Start: 2024-06-14 | End: 2024-08-13

## 2024-06-14 RX ORDER — ACETAMINOPHEN 325 MG/1
650 TABLET ORAL EVERY 6 HOURS PRN
Qty: 56 TABLET | Refills: 0 | Status: SHIPPED | OUTPATIENT
Start: 2024-06-14 | End: 2024-06-21

## 2024-06-14 ASSESSMENT — COGNITIVE AND FUNCTIONAL STATUS - GENERAL
PATIENT BASELINE BEDBOUND: NO
DAILY ACTIVITIY SCORE: 24
MOBILITY SCORE: 24

## 2024-06-14 ASSESSMENT — ACTIVITIES OF DAILY LIVING (ADL)
HEARING - RIGHT EAR: FUNCTIONAL
HEARING - LEFT EAR: FUNCTIONAL
PATIENT'S MEMORY ADEQUATE TO SAFELY COMPLETE DAILY ACTIVITIES?: YES
LACK_OF_TRANSPORTATION: PATIENT DECLINED
GROOMING: INDEPENDENT
WALKS IN HOME: INDEPENDENT
ASSISTIVE_DEVICE: EYEGLASSES
FEEDING YOURSELF: INDEPENDENT
ADEQUATE_TO_COMPLETE_ADL: YES
DRESSING YOURSELF: INDEPENDENT
JUDGMENT_ADEQUATE_SAFELY_COMPLETE_DAILY_ACTIVITIES: YES
TOILETING: INDEPENDENT
BATHING: INDEPENDENT

## 2024-06-14 ASSESSMENT — PAIN DESCRIPTION - DESCRIPTORS
DESCRIPTORS: CRAMPING
DESCRIPTORS: CRAMPING

## 2024-06-14 NOTE — CARE PLAN
The patient's goals for the shift include rest, minimal vaginal bleeding    The clinical goals for the shift include pain management, plan for care    Patient discharged with pain well controlled, scant VB, plan to follow-up with GYN next week for OR. Wound care performed by wound team this afternoon. Patient feels ready to go home, waiting to be picked up by . Will leave floor via wheelchair.

## 2024-06-14 NOTE — CONSULTS
"Nutrition Note:   Nutrition Assessment    Reason for Assessment: Admission nursing screening (stage 3 or 4 ulcer)    Patient is a 54 y.o. female presenting with vaginal bleeding    PMH: ESRD on HD, T2DM and HTN, anemia, bilateral foot ulcers s/p debridement and bone biopsy (1/30), stroke     Nutrition History:  Food and Nutrient History: Attempted to meet with pt - she was on phone and did not acknowledge RDN. RN and RDN discussed her intake this admission as it was decided to not press her to get off of the phone (RN informed RDN that pt as been variable if she is cooperative in interviews at baseline). Bedside nurse explained she has eaten a few meals; however, did experience emesis this AM. Unclear reasoning, but noted pt explained sometimes food does not settle in her stomach.  Food Allergies/Intolerances:  None       Anthropometrics:  Height: 165.1 cm (5' 5\")   Weight: 80.7 kg (178 lb)   BMI (Calculated): 29.62  IBW/kg (Dietitian Calculated): 56.8 kg  Percent of IBW: 142 %       Weight History:   Wt Readings from Last 10 Encounters:   06/12/24 80.7 kg (178 lb)       Weight Change %:  Weight History / % Weight Change: No detailed wt hx to review    Nutrition Focused Physical Exam Findings:  defer: unable to meet with pt for full assessment - visually appears to have adequate stores   Edema:  Edema: ascites (documented per team as nephrogenic)  Physical Findings  Skin: stage 3 ulcers LE per RN      Nutrition Significant Labs:  CBC Trend:   Results from last 7 days   Lab Units 06/14/24  0901 06/13/24  2234 06/13/24  0829 06/13/24  0019   WBC AUTO x10*3/uL 14.5* 12.8* 12.8* 12.7*   RBC AUTO x10*6/uL 3.24* 3.22* 3.20* 2.93*   HEMOGLOBIN g/dL 7.7* 7.7* 7.7* 7.1*   HEMATOCRIT % 26.5* 26.2* 26.0* 23.4*   MCV fL 82 81 81 80   PLATELETS AUTO x10*3/uL 248 258 271 276   BMP Trend:   Results from last 7 days   Lab Units 06/14/24  0901 06/13/24  0829 06/12/24  1250   GLUCOSE mg/dL 152* 91 78   CALCIUM mg/dL 7.9* 7.2* 7.8* "   SODIUM mmol/L 140 140 140   POTASSIUM mmol/L 4.8 5.1 4.7   CO2 mmol/L 23 25 24   CHLORIDE mmol/L 97* 96* 96*   BUN mg/dL 61* 75* 69*   CREATININE mg/dL 9.05* 10.54* 9.69*   A1C:  Lab Results   Component Value Date    HGBA1C 6.9 (H) 05/05/2024   BG POCT trend:   Results from last 7 days   Lab Units 06/14/24  1234 06/14/24  0840 06/14/24  0551 06/13/24  1458 06/13/24  1052   POCT GLUCOSE mg/dL 154* 160* 178* 124* 97   Renal Lab Trend:   Results from last 7 days   Lab Units 06/14/24  0901 06/13/24  0829 06/12/24  1250   POTASSIUM mmol/L 4.8 5.1 4.7   PHOSPHORUS mg/dL  --  10.5*  --    SODIUM mmol/L 140 140 140   EGFR mL/min/1.73m*2 5* 4* 4*   BUN mg/dL 61* 75* 69*   CREATININE mg/dL 9.05* 10.54* 9.69*     Nutrition Specific Medications:  Scheduled medications  desmopressin, 0.3 mcg/kg, intravenous, Once  epoetin guy or biosimilar, 10,000 Units, subcutaneous, Once per day on Monday Wednesday Friday  insulin lispro, 0-5 Units, subcutaneous, TID  medroxyPROGESTERone, 20 mg, oral, q8h  polyethylene glycol, 17 g, oral, Daily  sennosides-docusate sodium, 2 tablet, oral, BID  sevelamer carbonate, 800 mg, oral, TID    Dietary Orders (From admission, onward)       Start     Ordered    06/14/24 0800  Adult diet Regular  Diet effective now        Question:  Diet type  Answer:  Regular    06/14/24 0800                  Estimated Needs:   Total Energy Estimated Needs (kCal): 1700 kCal  Method for Estimating Needs: per IBW  Total Protein Estimated Needs (g): 70 g  Method for Estimating Needs: ~1.2g/kg per IBW       Nutrition Diagnosis   Malnutrition Diagnosis  Patient has Malnutrition Diagnosis:  (need to obtain more info)    Nutrition Diagnosis  Patient has Nutrition Diagnosis: Yes  Diagnosis Status (1): New  Nutrition Diagnosis 1: Increased nutrient needs  Related to (1): increased metabolic demand  As Evidenced by (1): stage 3 ulcers, on HD       Nutrition Interventions/Recommendations     If pt is skipping meals and/or  appetite is inadequate, then please order Ensure High Protein TID.   Twice weekly weights to track trend.   Recommend renal MVI       Nutrition Monitoring and Evaluation   Food/Nutrient Related History Monitoring  Monitoring and Evaluation Plan: Energy intake    Body Composition/Growth/Weight History  Monitoring and Evaluation Plan: Weight         Nutrition Focused Physical Findings  Monitoring and Evaluation Plan: Skin    Time Spent/Follow-up Reminder:   Time Spent (min): 45 minutes  Last Date of Nutrition Visit: 06/14/24  Nutrition Follow-Up Needed?: 3-8 days  Follow up Comment: obtain intake and wt hx; provided ONS and vit recs

## 2024-06-14 NOTE — CONSULTS
Internal Medicine Consult Note    Dodie Contreras is a 54 y.o. year old female patient with PMHx of SRD on HD 2/2 T2DM and HTN, anemia of chronic disease, bilateral foot ulcers s/p debridement and bone biopsy (1/30), hx of stroke and seizure (2017) admitted for acute episode of vaginal bleeding. Patient is planned for hysteroscopy. Internal medicine was consulted for Pre-operative evaluation and risk stratification.      Subjective   Met with patient at bedside and confirmed medical history above. Per patient she has been feeling fairly well lately with the exception of her abdominal pain and bleeding and increased leg swelling which she states is improved since having ascites drained. Patient reports no significant cardiac history. She says she has had no prior echos and never had a stress test. She also reports no respiratory history. She uses no inhalers and has no smoking history.    The patient has had no recent episodes of chest pain, SOB, LOC, and palpitations. She states she is able to walk around her house, do light housework as well as go up one flight of stairs with some difficulty. She states she can lie flat without SOB and has had no other new symptoms.         Risk Stratification Scores:  RCRI: 2, Class III, 10.1% 30-day risk of death, MI or cardiac arrest  DASI: 15.45  METS: 4.64      Echo: No prior Echo    ECG: Ordered, not yet completed    Past Medical History   has no past medical history on file.    Past Surgical History   has no past surgical history on file.    Allergies  Allergies   Allergen Reactions    Latex Itching       Social History   reports that she has never smoked. She has never used smokeless tobacco. She reports that she does not drink alcohol and does not use drugs.          Objective     Physical Exam:  Constitutional: No acute distress, normal appearing  Head/Neck: Normocephalic, Atraumatic, Trachea midline  ENT: Mucous membranes moist, no lesions  Cardio: Heart regular rate, clear  S1 & S2, systolic murmur heard at apex and RUSB, capillary refill < 2 sec  Respiratory: Lungs cta b/l, good respiratory effort, normal aeration, no crackles, rhonchi or wheezes appreciated  Abdominal: Soft, nontender, non-distended  Extremities: +1 pitting edema in BLE, radial and DP pulses 2+ b/l  Skin: warm and dry, no redness, bruising or bleeding  Neuro: Aox3, CN grossly intact, moving all 4 extremities  Behavioral: appropriate mood and behavior    Vitals:    06/14/24 0756   BP: (!) 152/75   Pulse: 66   Resp: 16   Temp: 36.7 °C (98.1 °F)   SpO2: 98%         Intake/Output Summary (Last 24 hours) at 6/14/2024 0857  Last data filed at 6/13/2024 2300  Gross per 24 hour   Intake 600 ml   Output 697 ml   Net -97 ml       Results for orders placed or performed during the hospital encounter of 06/12/24 (from the past 24 hour(s))   POCT GLUCOSE   Result Value Ref Range    POCT Glucose 97 74 - 99 mg/dL   POCT GLUCOSE   Result Value Ref Range    POCT Glucose 124 (H) 74 - 99 mg/dL   Sterile Fluid Culture/Smear    Specimen: Ascites Fluid   Result Value Ref Range    Gram Stain (2+) Few Polymorphonuclear leukocytes     Gram Stain No organisms seen    Albumin, Fluid   Result Value Ref Range    Albumin, Fluid 1.8 Not established g/dL   Amylase, Fluid   Result Value Ref Range    Amylase, Fluid <10 Not established. U/L   Body Fluid Cell Count   Result Value Ref Range    Color, Fluid Yellow Colorless, Straw, Yellow    Clarity, Fluid Clear Clear    WBC, Fluid 273 See Comment /uL    RBC, Fluid 1,000 0  /uL /uL   Body Fluid Differential   Result Value Ref Range    Neutrophils %, Manual, Fluid 7 <25 % %    Lymphocytes %, Manual, Fluid 73 <75 % %    Mono/Macrophages %, Manual, Fluid 20 <70 % %    Eosinophils %, Manual, Fluid 0 0 % %    Basophils %, Manual, Fluid 0 0 % %    Immature Granulocytes %, Manual, Fluid 0 0 % %    Blasts %, Manual, Fluid 0 0 % %    Unclassified Cells %, Manual, Fluid 0 0 % %    Plasma Cells %, Manual, Fluid 0 0  % %    Total Cells Counted, Fluid 100    Protein, Total Fluid   Result Value Ref Range    Protein, Total Fluid 3.7 Not established g/dL   Sodium, Body Fluid   Result Value Ref Range    Sodium Fluid 142 Not established mmol/L   Potassium, Fluid   Result Value Ref Range    Potassium, Fluid 4.2 Not established mmol/L   CBC   Result Value Ref Range    WBC 12.8 (H) 4.4 - 11.3 x10*3/uL    nRBC 0.2 (H) 0.0 - 0.0 /100 WBCs    RBC 3.22 (L) 4.00 - 5.20 x10*6/uL    Hemoglobin 7.7 (L) 12.0 - 16.0 g/dL    Hematocrit 26.2 (L) 36.0 - 46.0 %    MCV 81 80 - 100 fL    MCH 23.9 (L) 26.0 - 34.0 pg    MCHC 29.4 (L) 32.0 - 36.0 g/dL    RDW 19.2 (H) 11.5 - 14.5 %    Platelets 258 150 - 450 x10*3/uL   Coagulation Screen   Result Value Ref Range    Protime 14.8 (H) 9.8 - 12.8 seconds    INR 1.3 (H) 0.9 - 1.1    aPTT 31 27 - 38 seconds   Fibrinogen   Result Value Ref Range    Fibrinogen 487 (H) 200 - 400 mg/dL   POCT GLUCOSE   Result Value Ref Range    POCT Glucose 178 (H) 74 - 99 mg/dL   POCT GLUCOSE   Result Value Ref Range    POCT Glucose 160 (H) 74 - 99 mg/dL       US guided abdominal paracentesis    Result Date: 6/13/2024  Interpreted By:  Juanito Garcia, STUDY: US GUIDED ABDOMINAL PARACENTESIS; 6/13/20243:37 pm   INDICATION: Signs/Symptoms:ascites.   COMPARISON: None.   ACCESSION NUMBER(S): [id number]   ORDERING CLINICIAN: KRYSTAL PNOCE   TECHNIQUE: INTERVENTIONALIST(S): Juanito Garcia   CONSENT: The patient/patient's POA/next of kin was informed of the nature of the proposed procedure. The purposes, alternatives, risks, and benefits were explained and discussed. All questions were answered and consent was obtained.   SEDATION: None   MEDICATION/CONTRAST:     TIME OUT: A time out was performed immediately prior to procedure start with the interventional team, correctly identifying the patient name, date of birth, MRN, procedure, anatomy (including marking of site and side), patient position, procedure consent form, relevant  laboratory and imaging test results, antibiotic administration, safety precautions, and procedure-specific equipment needs.   FINDINGS: The patient was placed in the supine position.   The abdominal space was examined with grey scale ultrasound, and the most accessible fluid identified and marked for paracentesis.   The skin was prepped and draped in usual manner. Local anesthesia with Lidocaine was administered and a right-sided paracentesis was performed.  A 5 Indonesian One-Step paracentesis needle/catheter was then placed where marked.  Approximately 1450 mL of yellowish colored fluid was removed.  The needle/catheter was then withdrawn.   The patient tolerated the procedure well and there were no immediate complications. Specimen(s) sent to the laboratory and pathology for further evaluation, per the requesting team.       Uneventful paracentesis, as detailed above. Right Hemiabdomen, 1450 mL   I personally performed and/or directly supervised this study and was present for the entire procedure.   Performed and dictated at [facility].   Signed by: Juanito Garcia 6/13/2024 3:37 PM Dictation workstation:   [id number]    CT abdomen pelvis w IV contrast    Result Date: 6/13/2024  Interpreted By:  Ventura Reyna and Benza Andrew STUDY: CT ABDOMEN PELVIS W IV CONTRAST;  6/12/2024 6:51 pm   INDICATION: Signs/Symptoms:abd pain, leukocytosis, vag bleeding.   COMPARISON: None.   ACCESSION NUMBER(S): [id number]   ORDERING CLINICIAN: BRIAN CARRILLO   TECHNIQUE: CT of the abdomen and pelvis was performed.  Standard contiguous axial images were obtained at 3 mm slice thickness through the abdomen and pelvis. Coronal and sagittal reconstructions at 3 mm slice thickness were performed.   80 ml of contrast Omnipaque 350 were administered intravenously without immediate complication.   FINDINGS: LOWER CHEST: There are mild bibasilar atelectatic changes. The visualized lung base is  otherwise unremarkable. There is four-chamber cardiac enlargement without pericardial effusion. Mitral valvular calcifications are noted and aortic annular calcifications are noted. No pleural effusion is present. Visualized distal esophagus appears normal.   ABDOMEN:   LIVER: The liver is mildly enlarged measuring 20 cm in craniocaudal length. No focal liver lesions are identified.   BILE DUCTS: The intrahepatic and extrahepatic ducts are not dilated.   GALLBLADDER: There is layering hyperdensity in the gallbladder lumen which may reflect sludge/stones or vicarious contrast excretion. There is no wall thickening.   PANCREAS: The pancreas is moderately atrophic. No focal lesions are evident.   SPLEEN: The spleen is normal in size without focal lesions.   ADRENAL GLANDS: Bilateral adrenal glands appear normal.   KIDNEYS AND URETERS: The kidneys are normal in size and enhance symmetrically.  No hydroureteronephrosis or nephroureterolithiasis is identified.   PELVIS:   BLADDER: There is irregular thickening of the anterior bladder wall with scattered punctate mural calcifications.   REPRODUCTIVE ORGANS: There is prominent tortuous vascularity surrounding the uterus and vagina.   BOWEL: The stomach is unremarkable.   There is mild diffuse small bowel wall thickening. The appendix appears normal.   VESSELS: There is no aneurysmal dilatation of the abdominal aorta. The IVC appears normal. There are severe atherosclerotic changes of the abdominal aorta and its branches.   PERITONEUM/RETROPERITONEUM/LYMPH NODES: There is moderate volume abdominopelvic ascites. There is no pneumoperitoneum or loculated fluid collection. No abdominopelvic lymphadenopathy is present. Prominent bilateral inguinal lymph nodes measure up to 1.5 cm in short axis are nonspecific and may be reactive.   BONES AND ABDOMINAL WALL: No suspicious osseous lesions are identified. Degenerative discogenic disease is noted in the lower thoracic and lumbar  spine.  There is diffuse body wall edema.       1. Findings of volume overload with moderate volume abdominopelvic ascites and diffuse body wall edema. 2. Mild diffuse small bowel wall thickening which can be reactive due to ascites, however correlate clinically with concern for enteritis. 3. Irregular thickening of the anterior urinary bladder wall. Correlate with urinalysis. Consider urologic evaluation and correlation with direct visualization to exclude malignancy. 4. Prominent tortuous vessels in the region of the uterus and vagina which can be seen in pelvic congestion syndrome. Correlate clinically and with pelvic ultrasound. 5. Four-chamber cardiomegaly with mitral annular calcifications. 6. Additional findings as above.   I personally reviewed the images/study and I agree with the findings as stated above by resident physician, Klaus Anderson MD. This study was interpreted at [place], [place].   MACRO: None   Signed by: Venutra Reyna 6/13/2024 8:53 AM Dictation workstation:   [id number]    US pelvis    Result Date: 6/12/2024  Interpreted By:  Alma Iraheta and Stephens Katherine STUDY: US PELVIS;  6/12/2024 2:58 pm   INDICATION: Signs/Symptoms:vag bleeding, post menopause, had biopsy 5/23.   COMPARISON: None.   ACCESSION NUMBER(S): [id number]   ORDERING CLINICIAN: ORNEL AMAYA   TECHNIQUE: Multiple multiplanar static gray scale, color and spectral waveform sonographic images of the pelvis were obtained. Transabdominal ultrasound was performed.  This examination was interpreted at [facility].   FINDINGS: UTERUS: The uterus measures  7.02 x 5.24 x 10.64 . Numerous diffuse calcifications within myometrium limiting evaluation.   ENDOMETRIUM: The endometrium measures a thickness of 1.5 cm, which is abnormally thickened.   RIGHT ADNEXA: The right ovary is not visualized.   LEFT ADNEXA: The left ovary is not visualized.   CUL  DE SAC: Small-to-moderate free fluid within the pelvis.       1. Abnormally thickened endometrium measuring up to 1.5 cm. Recommend correlation with endometrial biopsy results if available or repeat biopsy for further evaluation. 2. Heterogeneous myometrium with diffuse calcifications, hindering assessment. 3. Small-to-moderate pelvic ascites. 4. Nonvisualization of the ovaries.   I personally reviewed the images/study and I agree with Eula Abbott DO's (radiology resident) findings as stated. This study was interpreted at [place], [place].   MACRO: None   Signed by: Alma Iraheta 6/12/2024 3:12 PM Dictation workstation:   [id number]          Assessment     Dodie Contreras is a 54 y.o. year old female patient with PMHx of SRD on HD 2/2 T2DM and HTN, anemia of chronic disease, bilateral foot ulcers s/p debridement and bone biopsy (1/30), hx of stroke (2017) admitted for acute episode of vaginal bleeding. Patient is planned for hysteroscopy. Internal medicine was consulted for Pre-operative evaluation and risk stratification.     #Pre-Operative Evaluation:  ::RCRI: 2, Class III, 10.1% 30-day risk of death, MI or cardiac arrest  ::DASI: 15.45  ::METS: 4.64  ::No Echos, No ECGs completed  ::No recent chest pain, LOC, palpitations, or lightheadedness  ::Systolic murmur heard on exam  - Recommend ECG prior to any planned surgical intervention  - Recommend TTE to evaluate etiology of murmur and generalized cardiac function    - Based on our evaluation of the patient and the risk factors discussed above the patient is considered moderate risk for cardiac complications in the perioperative period. The patient has history significant for stroke and insulin dependent diabetes as well as a systolic murmur on exam. Additionally the patients functional status is appropriate for surgery with METS>4 but does not have a strong cardiovascular reserve. The patient is low risk for  respiratory complications in the perioperative period, she has no significant respiratory history and has no significant recent SOB. The patient should have an ECG completed prior to any procedure to evaluate for any acute or possible ischemic changes. Additionally an echo is recommended to evaluate the patient for her murmur as well as general cardiac function.   If an urgent or emergent procedure is indicated the above recommendations should not preclude the patient from undergoing surgical intervention. Please consider the risks and benefits of the procedure and use appropriate judgement if urgent or emergent surgical intervention is indicated.     The internal medicine consult team will follow to await the results of the ECG and Echo.         This patient was seen and discussed with the attending physician.    Varinder Carroll MD, PGY-1

## 2024-06-14 NOTE — PROGRESS NOTES
"Gynecology Progress Note    Assessment/Plan   55 yo F with ESRD on HD 2/2 T2DM and HTN, anemia of chronic disease, bilateral foot ulcers s/p debridement and bone biopsy (1/30), hx of stroke (2017) admitted for acute episode of vaginal bleeding.     Vaginal bleeding  Etiology unclear at this time. Pt s/p extensive PMB workup at OSH in May for initial episode including cervical biopsies showing acute and chronic inflammation, negative EMB (6/4), and ASCUS/hrHPV(-) Pap (5/29). Per report at that time, cervix \"grossly abnormal.\" Transabdominal ultrasound showed thickened endometrial stripe; heterogenous myometrium with diffuse calcifications, and small-moderate pelvic ascites. Unable to visualize ovaries. Subsequent CT A/P showed large-volume ascites, irregularly thickened anterior bladder wall, and tortuous vessels in the region of the uterus and vagina consistent with pelvic congestion syndrome.   - Pelvic exam 6/13 PM \"30cc blood clot in vaginal vault, upon removal, friable and tissue breakdown noted overlying cervix \"  - Discussed with patient requires hysteroscopy, cystoscopy and given additional bleeding, recommend during this admission  - Will require periop optimization; PAT consult today     Anemia  - Acute on chronic with down-trending hemoglobin after acute episode of vaginal bleeding. Vaginal bleeding now minimal after first Provera dose and vitals hemodynamically normal. Likely equilibrating.  - s/p DDAVP in ED for concern for uremic bleeding  - s/p 1 U pRBCs, post-transfusion CBC pending   - Follow-up AM CBC  - Continue pad counts     Ascites  - Possibly nephrogenic in setting of ESRD. s/p abdominal paracentesis on 5/6 with no malignant cells identified on cytology. CT A/P without evidence of ovarian pathology or peritoneal disease. LFTs unremarkable.  - Plan for IR-guided drainage in AM     ESRD  - Reports no longer makes urine. Has been undergoing dialysis for approximately 1.5 years.  - For dialysis in " "AM to be coordinated by nephrology  - Follow-up Nephrology recs     HTN  - Continue amlodipine, carvedilol     T2DM  - q4h BG checks while NPO     Cardiac  - Asymptomatic from a cardiac standpoint with normal heart rate.   - CT A/P with mitral valve calcifications and four-chamber cardiomegaly.   - ECHO (2020) without evidence of LV hypertrophy or valvular abnormalities. EF 65%.  - Consider repeat TTE in AM    Constipation  -chronic, will start Miralax and Pericolace     To be seen and d/w Dr. Huitron,   Lavern Allen MD, PGY-2     Principal Problem:    Vaginal bleeding    Problem List       No episode was linked to this visit.            Subjective   Pt is doing okay, vaginal bleeding has improved since given Provera. Continues to have abdominal pain and distension and reported \"fecal compaction\". Currently, NPO, denies overnight emesis.     Objective   Allergies:   Latex    Last Vitals:  Temp Pulse Resp BP MAP Pulse Ox   37.4 °C (99.3 °F) 72 18 121/64   98 %     Vitals Min/Max Last 24 Hours:  Temp  Min: 36.1 °C (97 °F)  Max: 37.5 °C (99.5 °F)  Pulse  Min: 70  Max: 77  Resp  Min: 18  Max: 20  BP  Min: 121/64  Max: 155/76    Intake/Output:     Intake/Output Summary (Last 24 hours) at 6/14/2024 0729  Last data filed at 6/13/2024 2300  Gross per 24 hour   Intake 800 ml   Output 697 ml   Net 103 ml       Physical Exam:  General: no acute distress   HEENT: normocephalic, atraumatic   Cards: RRR   Pulm: lungs CTAB   Abdomen: firm, distended, diffuse lower abdomen tenderness   : bright red streak on mess underwear  Neuro: no focal deficits       Lab Data:  .  Results for orders placed or performed during the hospital encounter of 06/12/24 (from the past 24 hour(s))   CBC   Result Value Ref Range    WBC 12.8 (H) 4.4 - 11.3 x10*3/uL    nRBC 0.2 (H) 0.0 - 0.0 /100 WBCs    RBC 3.20 (L) 4.00 - 5.20 x10*6/uL    Hemoglobin 7.7 (L) 12.0 - 16.0 g/dL    Hematocrit 26.0 (L) 36.0 - 46.0 %    MCV 81 80 - 100 fL    MCH 24.1 (L) 26.0 " - 34.0 pg    MCHC 29.6 (L) 32.0 - 36.0 g/dL    RDW 18.6 (H) 11.5 - 14.5 %    Platelets 271 150 - 450 x10*3/uL   Renal Function Panel   Result Value Ref Range    Glucose 91 74 - 99 mg/dL    Sodium 140 136 - 145 mmol/L    Potassium 5.1 3.5 - 5.3 mmol/L    Chloride 96 (L) 98 - 107 mmol/L    Bicarbonate 25 21 - 32 mmol/L    Anion Gap 24 (H) 10 - 20 mmol/L    Urea Nitrogen 75 (H) 6 - 23 mg/dL    Creatinine 10.54 (H) 0.50 - 1.05 mg/dL    eGFR 4 (L) >60 mL/min/1.73m*2    Calcium 7.2 (L) 8.6 - 10.6 mg/dL    Phosphorus 10.5 (H) 2.5 - 4.9 mg/dL    Albumin 2.7 (L) 3.4 - 5.0 g/dL   POCT GLUCOSE   Result Value Ref Range    POCT Glucose 97 74 - 99 mg/dL   POCT GLUCOSE   Result Value Ref Range    POCT Glucose 124 (H) 74 - 99 mg/dL   Sterile Fluid Culture/Smear    Specimen: Ascites Fluid   Result Value Ref Range    Gram Stain (2+) Few Polymorphonuclear leukocytes     Gram Stain No organisms seen    Albumin, Fluid   Result Value Ref Range    Albumin, Fluid 1.8 Not established g/dL   Amylase, Fluid   Result Value Ref Range    Amylase, Fluid <10 Not established. U/L   Body Fluid Cell Count   Result Value Ref Range    Color, Fluid Yellow Colorless, Straw, Yellow    Clarity, Fluid Clear Clear    WBC, Fluid 273 See Comment /uL    RBC, Fluid 1,000 0  /uL /uL   Body Fluid Differential   Result Value Ref Range    Neutrophils %, Manual, Fluid 7 <25 % %    Lymphocytes %, Manual, Fluid 73 <75 % %    Mono/Macrophages %, Manual, Fluid 20 <70 % %    Eosinophils %, Manual, Fluid 0 0 % %    Basophils %, Manual, Fluid 0 0 % %    Immature Granulocytes %, Manual, Fluid 0 0 % %    Blasts %, Manual, Fluid 0 0 % %    Unclassified Cells %, Manual, Fluid 0 0 % %    Plasma Cells %, Manual, Fluid 0 0 % %    Total Cells Counted, Fluid 100    Protein, Total Fluid   Result Value Ref Range    Protein, Total Fluid 3.7 Not established g/dL   Sodium, Body Fluid   Result Value Ref Range    Sodium Fluid 142 Not established mmol/L   Potassium, Fluid   Result Value Ref  Range    Potassium, Fluid 4.2 Not established mmol/L   CBC   Result Value Ref Range    WBC 12.8 (H) 4.4 - 11.3 x10*3/uL    nRBC 0.2 (H) 0.0 - 0.0 /100 WBCs    RBC 3.22 (L) 4.00 - 5.20 x10*6/uL    Hemoglobin 7.7 (L) 12.0 - 16.0 g/dL    Hematocrit 26.2 (L) 36.0 - 46.0 %    MCV 81 80 - 100 fL    MCH 23.9 (L) 26.0 - 34.0 pg    MCHC 29.4 (L) 32.0 - 36.0 g/dL    RDW 19.2 (H) 11.5 - 14.5 %    Platelets 258 150 - 450 x10*3/uL   Coagulation Screen   Result Value Ref Range    Protime 14.8 (H) 9.8 - 12.8 seconds    INR 1.3 (H) 0.9 - 1.1    aPTT 31 27 - 38 seconds   Fibrinogen   Result Value Ref Range    Fibrinogen 487 (H) 200 - 400 mg/dL   POCT GLUCOSE   Result Value Ref Range    POCT Glucose 178 (H) 74 - 99 mg/dL

## 2024-06-14 NOTE — CONSULTS
Wound Care Consult     Visit Date: 6/14/2024      Patient Name: Dodie Contreras         MRN: [id number]           YOB: 1970     Reason for Consult: Left plantar foot wound/ Left lower leg wound/ Right heel wound       Wound Assessment:  Wound 06/13/24 Pressure Injury Foot Left;Plantar (Active)   Wound Image     06/13/24 1311   Site Assessment Unable to assess 06/14/24 0834   Oralia-Wound Assessment Black 06/14/24 0834   Pressure Injury Stage O 06/14/24 0834   Shape circular 06/13/24 1255   State of Healing Non-healing 06/13/24 1255   Margins Well-defined edges 06/13/24 1255   Drainage Description None 06/14/24 0834   Dressing Other (Comment) 06/14/24 0834   Dressing Changed Changed 06/13/24 1322   Dressing Status Clean;Dry 06/13/24 2004       Wound 06/13/24 Pressure Injury Pretibial Left (Active)   Wound Image    06/13/24 1312   Site Assessment Unable to assess 06/14/24 0834   Oralia-Wound Assessment Clean;Dry;Intact 06/14/24 0834   Non-staged Wound Description Not applicable 06/13/24 1301   Pressure Injury Stage O 06/14/24 0834   Shape circular 06/13/24 1301   Margins Well-defined edges 06/13/24 1301   Drainage Description None 06/14/24 0834   Drainage Amount None 06/13/24 1301   Dressing Other (Comment) 06/14/24 0834   Dressing Changed New 06/13/24 1322   Dressing Status Clean;Dry 06/13/24 2004       Wound Heel Right (Active)     Wound location: Right heel      Size: 6 x 6 cm      Undermining: unable to determine    Tracking: unable to determine    Wound type: unstageable pressure injury vs vascular compromise resulting in tissue damage  Wound bed: moist gray partially deroofed fluid filled blister  Draining: cloudy serosanguineous drainage  Periwound skin: denuded/ calloused    Recommendations: DAILY      Irrigate with normal saline or wound cleanser      Cover with Aquacel Ag (Silver) ([employer] order #[id number])        Pad with ABD     Secure with Kerlix gauze     Elevate heels off the bed surface at  all times.      Wound location: Left plantar 5th toe/ LLE/ Left plantar foot  Recommendations: EVERY OTHER DAY      Irrigate with normal saline or wound cleanser       Cover wound bed with Hydrofera Blue ready      Cover with Mepilex border dressing        Elevate heels off the bed surface at all times.    Wound Team Plan: Highly recommend a podiatry consult for Right heel and Left plantar foot wounds. Primary provider, please review recommendation. If you agree with recommendation please enter as wound orders in EMR. Thank you.     While inpatient, Secure chat with questions or if condition changes. For urgent communications please [place] at [phone].       Magalys Bolton RN, CWON  6/14/2024  3:37 PM

## 2024-06-14 NOTE — SIGNIFICANT EVENT
"Late entry:   Notified by RN that patient had passage of 60g blood clot with persistent trickling of bright red vaginal bleeding upon standing. At bedside, patient reports return of moderate-severe lower abdominal cramping and watery vaginal bleeding in toilet bowl and feeling dizzy. She reports an episode of emesis after eating dinner (didn't notify RN) but currently denies N/V. She is s/p paracentesis of 1.5L fluid removed, but denies pain at entry site.     ./69   Pulse 75   Temp 36.5 °C (97.7 °F) (Temporal)   Resp 20   Ht 1.651 m (5' 5\")   Wt 80.7 kg (178 lb)   SpO2 99%   BMI 29.62 kg/m²     General: no acute distress   HEENT: normocephalic, atraumatic   Cards: RRR   Pulm: lungs CTAB   Abdomen: improved firmness compared to prior exam before paracentesis, improved distension, tenderness in lower abdomen   : speculum limited but noted for 30cc blood clot in vaginal vault, upon removal, friable and tissue breakdown noted overlying cervix   Extremities: diffuse pinpoint skin lesions and digit ulcers   Neuro: at baseline, no acute focal deficits      Labs  .  Results for orders placed or performed during the hospital encounter of 06/12/24 (from the past 24 hour(s))   POCT GLUCOSE   Result Value Ref Range    POCT Glucose 89 74 - 99 mg/dL   CBC   Result Value Ref Range    WBC 12.8 (H) 4.4 - 11.3 x10*3/uL    nRBC 0.2 (H) 0.0 - 0.0 /100 WBCs    RBC 3.20 (L) 4.00 - 5.20 x10*6/uL    Hemoglobin 7.7 (L) 12.0 - 16.0 g/dL    Hematocrit 26.0 (L) 36.0 - 46.0 %    MCV 81 80 - 100 fL    MCH 24.1 (L) 26.0 - 34.0 pg    MCHC 29.6 (L) 32.0 - 36.0 g/dL    RDW 18.6 (H) 11.5 - 14.5 %    Platelets 271 150 - 450 x10*3/uL   Renal Function Panel   Result Value Ref Range    Glucose 91 74 - 99 mg/dL    Sodium 140 136 - 145 mmol/L    Potassium 5.1 3.5 - 5.3 mmol/L    Chloride 96 (L) 98 - 107 mmol/L    Bicarbonate 25 21 - 32 mmol/L    Anion Gap 24 (H) 10 - 20 mmol/L    Urea Nitrogen 75 (H) 6 - 23 mg/dL    Creatinine 10.54 (H) " 0.50 - 1.05 mg/dL    eGFR 4 (L) >60 mL/min/1.73m*2    Calcium 7.2 (L) 8.6 - 10.6 mg/dL    Phosphorus 10.5 (H) 2.5 - 4.9 mg/dL    Albumin 2.7 (L) 3.4 - 5.0 g/dL   POCT GLUCOSE   Result Value Ref Range    POCT Glucose 97 74 - 99 mg/dL   POCT GLUCOSE   Result Value Ref Range    POCT Glucose 124 (H) 74 - 99 mg/dL   Sterile Fluid Culture/Smear    Specimen: Ascites Fluid   Result Value Ref Range    Gram Stain (2+) Few Polymorphonuclear leukocytes     Gram Stain No organisms seen    Albumin, Fluid   Result Value Ref Range    Albumin, Fluid 1.8 Not established g/dL   Amylase, Fluid   Result Value Ref Range    Amylase, Fluid <10 Not established. U/L   Body Fluid Cell Count   Result Value Ref Range    Color, Fluid Yellow Colorless, Straw, Yellow    Clarity, Fluid Clear Clear    WBC, Fluid 273 See Comment /uL    RBC, Fluid 1,000 0  /uL /uL   Body Fluid Differential   Result Value Ref Range    Neutrophils %, Manual, Fluid 7 <25 % %    Lymphocytes %, Manual, Fluid 73 <75 % %    Mono/Macrophages %, Manual, Fluid 20 <70 % %    Eosinophils %, Manual, Fluid 0 0 % %    Basophils %, Manual, Fluid 0 0 % %    Immature Granulocytes %, Manual, Fluid 0 0 % %    Blasts %, Manual, Fluid 0 0 % %    Unclassified Cells %, Manual, Fluid 0 0 % %    Plasma Cells %, Manual, Fluid 0 0 % %    Total Cells Counted, Fluid 100    Protein, Total Fluid   Result Value Ref Range    Protein, Total Fluid 3.7 Not established g/dL   Sodium, Body Fluid   Result Value Ref Range    Sodium Fluid 142 Not established mmol/L   Potassium, Fluid   Result Value Ref Range    Potassium, Fluid 4.2 Not established mmol/L   CBC   Result Value Ref Range    WBC 12.8 (H) 4.4 - 11.3 x10*3/uL    nRBC 0.2 (H) 0.0 - 0.0 /100 WBCs    RBC 3.22 (L) 4.00 - 5.20 x10*6/uL    Hemoglobin 7.7 (L) 12.0 - 16.0 g/dL    Hematocrit 26.2 (L) 36.0 - 46.0 %    MCV 81 80 - 100 fL    MCH 23.9 (L) 26.0 - 34.0 pg    MCHC 29.4 (L) 32.0 - 36.0 g/dL    RDW 19.2 (H) 11.5 - 14.5 %    Platelets 258 150 - 450  x10*3/uL   Coagulation Screen   Result Value Ref Range    Protime 14.8 (H) 9.8 - 12.8 seconds    INR 1.3 (H) 0.9 - 1.1    aPTT 31 27 - 38 seconds   Fibrinogen   Result Value Ref Range    Fibrinogen 487 (H) 200 - 400 mg/dL         Acute vaginal bleeding  - pt presented to ED for acute vaginal bleeding with unclear etiology and started on Provera 20mg TID with initially improving vaginal bleeding, now with acute worsening vaginal bleeding when standing. Bedside pelvic exam c/f cervical vs uterine etiology of vaginal bleeding, however etiology remains unknown, vaginal bleeding stabilized after clot removal and evacuation of pooled blood in vaginal vault.  - last Hgb 7.7, repeat hgb stable, 7.7   - BP normotensive ,HR 70's and SpO2 99% on RA   - Given recurrent episodes of heavy vaginal bleeding during admission, recommend EUA: D&C hysteroscopy and endometrial sampling during current admission. Patient for pre-op surgical clearance in the AM   - continue pad counts  - continue tylenol and oxycodone prn for pain control      D/w Dr. Oscar,  Angela Fam MD, PGY-2

## 2024-06-14 NOTE — DISCHARGE SUMMARY
Discharge Summary    Admission Date: 6/12/2024  Discharge Date: 6/14/2024    Discharge Diagnosis  Vaginal bleeding    Hospital Course  53 yo F with ESRD on Hemodialysis  2/2 T2DM and HTN, and anemia of chronic disease. Pt was admitted on 6/12 with acute vaginal bleeding. Admissions labs notable for Hgb 8.6, BUN 69 and Cr 9.69. Pelvic exam difficult to complete d/t profuse bleeding and patient intolerance. Transabdominal ultrasound showed thickened endometrial strip, heterogenous myometriu with diffuse calcifications, and small-moderate pelvic ascites. Subsequent CT A/P showed large-volume ascites and irregularly thickened anterior bladder wall. She was started on Provera 20mg TID for bleeding. Repeat Hgb was 7.1, 1u pRBC was administered. On HD2, Hgb improved to 7.7. IR performed an abdominal paracentesis with 1450ml of fluid drained. Ascites was attributed to ESRD. She remained on her home schedule for dialysis during admission. Nephrology was consulted with recommendations to start Epogen. Wound care was also consulted for bilateral foot ulcers and recommended podiatry, for which an outpatient referral was placed. Early morning HD3, she had increased bleeding with passage of 60g blood clot. Pt reported moderate-severe lower abdominal cramping. Bleeding stabilized after evacuation of pooled blood in vaginal vault. Plan was made for D&C hysteroscopy to evaluate sources of AUB. Medicine was consulted for a pre-operative evaluation - Final recommendations are to obtain a pre-op ECG/TTE. By the evening of HD3, Dodie was stable for discharge. Scheduling was contacted for OR time the following week. She was discharged with a continued course of Provera 20mg TID.     Pertinent Physical Exam At Time of Discharge  General: no acute distress   HEENT: normocephalic, atraumatic   Cards: RRR  Pulm: lungs CTAB, normal work of breathing on room air   Abdomen: improved firmness compared to prior exam before paracentesis, improved  distension, tenderness in lower abdomen   Neuro: no focal deficits   Extremities: diffuse pinpoint skin lesions and digit ulcers    Discharge Meds     Your medication list        START taking these medications        Instructions Last Dose Given Next Dose Due   acetaminophen 325 mg tablet  Commonly known as: Tylenol      Take 2 tablets (650 mg) by mouth every 6 hours if needed for mild pain (1 - 3) for up to 7 days.       amLODIPine 10 mg tablet  Commonly known as: Norvasc      Take 1 tablet (10 mg) by mouth once daily.       aspirin 81 mg chewable tablet  Start taking on: Aye 15, 2024      Chew 1 tablet (81 mg) once daily.       atorvastatin 80 mg tablet  Commonly known as: Lipitor      Take 1 tablet (80 mg) by mouth once daily at bedtime.       carvedilol 25 mg tablet  Commonly known as: Coreg      Take 1 tablet (25 mg) by mouth 2 times a day.       medroxyPROGESTERone 10 mg tablet  Commonly known as: Provera      Take 2 tablets (20 mg) by mouth every 8 hours.                 Where to Get Your Medications        These medications were sent to [address]  [address] [address]      Phone: [phone]   acetaminophen 325 mg tablet  amLODIPine 10 mg tablet  aspirin 81 mg chewable tablet  atorvastatin 80 mg tablet  carvedilol 25 mg tablet  medroxyPROGESTERone 10 mg tablet          Test Results Pending At Discharge  Pending Labs       Order Current Status    Non-gynecologic cytology In process    Potassium, Fluid In process    Sodium, Fluid In process    Sterile Fluid Culture/Smear Preliminary result            Outpatient Follow-Up  No future appointments.      BRODY WALSH    I agree with the assessment above, and have made edits within text.  Discussed with Dr. Dave Jacobs MD, PGY-1  GYN Team Pager [phone]

## 2024-06-14 NOTE — DISCHARGE INSTRUCTIONS
Post-Operative Instructions     Call if you have:  Vaginal bleeding soaking >2 pads per hour for 2 hours  Temperature greater than 100.4  Persistent nausea and vomiting  Severe uncontrolled pain  Difficulty breathing, headache or visual disturbances  Hives  Persistent dizziness or light-headedness  Extreme fatigue  Any other questions or concerns you may have after discharge    In an emergency, call 911 or go to an Emergency Department at a nearby hospital.     We are in the process of scheduling you for an echocardiogram (heart ultrasound) and endometrial sampling for next week. You will receive a call to schedule the echocardiogram and you will receive a call about the time and date of surgery.

## 2024-06-16 LAB
BACTERIA FLD CULT: NORMAL
GRAM STN SPEC: NORMAL
GRAM STN SPEC: NORMAL

## 2024-06-17 ENCOUNTER — HOSPITAL ENCOUNTER (OUTPATIENT)
Facility: HOSPITAL | Age: 54
Setting detail: OUTPATIENT SURGERY
End: 2024-06-17
Attending: OBSTETRICS & GYNECOLOGY | Admitting: OBSTETRICS & GYNECOLOGY
Payer: COMMERCIAL

## 2024-06-17 ENCOUNTER — PREP FOR PROCEDURE (OUTPATIENT)
Dept: OBSTETRICS AND GYNECOLOGY | Facility: HOSPITAL | Age: 54
End: 2024-06-17
Payer: COMMERCIAL

## 2024-06-17 ENCOUNTER — TELEPHONE (OUTPATIENT)
Dept: OBSTETRICS AND GYNECOLOGY | Facility: CLINIC | Age: 54
End: 2024-06-17
Payer: COMMERCIAL

## 2024-06-17 DIAGNOSIS — R01.1 MURMUR, CARDIAC: Primary | ICD-10-CM

## 2024-06-17 DIAGNOSIS — N93.9 ABNORMAL UTERINE BLEEDING (AUB): Primary | ICD-10-CM

## 2024-06-17 LAB
ATRIAL RATE: 68 BPM
P AXIS: 49 DEGREES
P OFFSET: 197 MS
P ONSET: 148 MS
PR INTERVAL: 144 MS
Q ONSET: 220 MS
QRS COUNT: 11 BEATS
QRS DURATION: 72 MS
QT INTERVAL: 424 MS
QTC CALCULATION(BAZETT): 450 MS
QTC FREDERICIA: 442 MS
R AXIS: -13 DEGREES
T AXIS: 233 DEGREES
T OFFSET: 432 MS
VENTRICULAR RATE: 68 BPM

## 2024-06-17 NOTE — TELEPHONE ENCOUNTER
----- Message from Randa Jacobs MD sent at 6/17/2024  2:43 PM EDT -----  Regarding: Patient coordination help  Zach Bruce, we have this patient that we're trying to have added on for the OR this Thursday. She is supposed to get an echo prior to surgery. Are you able to assist her scheduling this? -Thanks

## 2024-06-17 NOTE — TELEPHONE ENCOUNTER
RN called scheduling for cardiology and they have the order and referral- they are going to try and reach her to schedule as there is an opening in [place] tomorrow.    I also left a msg for the pt to call the scheduling number for this at . Patient is not signed up for my chart.

## 2024-06-18 ENCOUNTER — TELEPHONE (OUTPATIENT)
Dept: OBSTETRICS AND GYNECOLOGY | Facility: CLINIC | Age: 54
End: 2024-06-18
Payer: COMMERCIAL

## 2024-06-18 NOTE — TELEPHONE ENCOUNTER
RN called cardiology office yesterday as they had an appointment in [place]  for today.I also left pt a message that they were trying to reach her and gave her their number to expedite scheduling.    Review of appts show no echo is scheduled yet. Left msg for pt - msg left for pt to contact cardiology scheduling line,

## 2024-06-19 ENCOUNTER — PREP FOR PROCEDURE (OUTPATIENT)
Dept: OBSTETRICS AND GYNECOLOGY | Facility: CLINIC | Age: 54
End: 2024-06-19
Payer: COMMERCIAL

## 2024-06-19 ENCOUNTER — ANESTHESIA EVENT (OUTPATIENT)
Dept: OPERATING ROOM | Facility: HOSPITAL | Age: 54
End: 2024-06-19

## 2024-06-19 NOTE — HOSPITAL COURSE
Dodie Contreras is a 54 y.o F with AUB, presenting for D&C hysteroscopy and any other indicated procedures. She has a history of ESRD on hemodialysis 2/2 T2DM and HTN.     [place] [facility] [place] and discharged on 6/15. During admission, she received 1u pRBC and was started on Provera 20mg TID, continued at discharge.     Pre-op labs : Hgb 7.7, Plt 248, Cr 9.05    Imaging    NEEDS PREOP ECG/TTE ***    US Pelvis 24  FINDINGS:  UTERUS:  The uterus measures  7.02 x 5.24 x 10.64 . Numerous diffuse  calcifications within myometrium limiting evaluation.      ENDOMETRIUM:  The endometrium measures a thickness of 1.5 cm, which is abnormally  thickened.      RIGHT ADNEXA:  The right ovary is not visualized.      LEFT ADNEXA:  The left ovary is not visualized.      CUL DE SAC:  Small-to-moderate free fluid within the pelvis.      IMPRESSION:  1. Abnormally thickened endometrium measuring up to 1.5 cm. Recommend  correlation with endometrial biopsy results if available or repeat  biopsy for further evaluation.  2. Heterogeneous myometrium with diffuse calcifications, hindering  assessment.  3. Small-to-moderate pelvic ascites.    PMHx: ESRD, T2DM, HTN, previous stroke, seizures, anemia 2/2 CKD  PSHx: eye surgeries  OBGYN: P6, x6   Screening: Menopausal, denies h/o abnormal pap  Meds: amlodipine 10mg, aspirin 81mg, atorvastatin 80mg, carvedilol 25mg BID, Provera 20mg TID  Soc: denies tobacco, alcohol, and illicit drug use  Allergies: NKDA

## 2024-06-20 ENCOUNTER — ANESTHESIA (OUTPATIENT)
Dept: OPERATING ROOM | Facility: HOSPITAL | Age: 54
End: 2024-06-20
Payer: COMMERCIAL

## 2024-06-20 LAB
LAB AP ASR DISCLAIMER: NORMAL
LABORATORY COMMENT REPORT: NORMAL
LABORATORY COMMENT REPORT: NORMAL
PATH REPORT.FINAL DX SPEC: NORMAL
PATH REPORT.GROSS SPEC: NORMAL
PATH REPORT.RELEVANT HX SPEC: NORMAL
PATH REPORT.TOTAL CANCER: NORMAL